# Patient Record
Sex: FEMALE | Race: WHITE | NOT HISPANIC OR LATINO | Employment: OTHER | URBAN - METROPOLITAN AREA
[De-identification: names, ages, dates, MRNs, and addresses within clinical notes are randomized per-mention and may not be internally consistent; named-entity substitution may affect disease eponyms.]

---

## 2017-02-14 ENCOUNTER — HOSPITAL ENCOUNTER (OUTPATIENT)
Dept: OTHER | Facility: HOSPITAL | Age: 67
Discharge: HOME | End: 2017-02-14
Attending: FAMILY MEDICINE | Admitting: FAMILY MEDICINE

## 2017-08-24 ENCOUNTER — HOSPITAL ENCOUNTER (OUTPATIENT)
Dept: OTHER | Facility: HOSPITAL | Age: 67
Discharge: HOME | End: 2017-08-24
Attending: FAMILY MEDICINE | Admitting: FAMILY MEDICINE

## 2017-10-02 ENCOUNTER — HOSPITAL ENCOUNTER (OUTPATIENT)
Dept: OTHER | Facility: HOSPITAL | Age: 67
Discharge: HOME | End: 2017-10-02
Attending: FAMILY MEDICINE | Admitting: FAMILY MEDICINE

## 2018-11-06 ENCOUNTER — HOSPITAL ENCOUNTER (OUTPATIENT)
Dept: OTHER | Facility: HOSPITAL | Age: 68
Discharge: HOME | End: 2018-11-06
Attending: FAMILY MEDICINE | Admitting: FAMILY MEDICINE

## 2018-11-12 ENCOUNTER — CONVERSION ENCOUNTER (OUTPATIENT)
Dept: FAMILY MEDICINE CLINIC | Facility: CLINIC | Age: 68
End: 2018-11-12

## 2018-11-12 LAB
ALBUMIN SERPL-MCNC: 3.9 G/DL (ref 3.6–5.1)
ALP SERPL-CCNC: 50 U/L (ref 33–130)
AST SERPL-CCNC: 21 U/L (ref 10–35)
BILIRUB SERPL-MCNC: 0.5 MG/DL (ref 0.2–1.2)
BUN SERPL-MCNC: 6 MG/DL (ref 7–25)
BUN/CREAT SERPL: 7.5 (CALC) (ref 6–22)
CALCIUM SERPL-MCNC: 9.2 MG/DL (ref 8.6–10.2)
CHLORIDE SERPL-SCNC: 98 MMOL/L (ref 98–110)
CONV CO2: 28 MMOL/L (ref 21–33)
CONV TOTAL PROTEIN: 6.6 G/DL (ref 6.2–8.3)
CREAT UR-MCNC: 0.8 MG/DL (ref 0.6–1.18)
GLOBULIN UR ELPH-MCNC: 2.7 MG/DL (ref 2.2–3.9)
GLUCOSE UR QL: 100 MG/DL (ref 65–99)
INSULIN SERPL-ACNC: 1.4 (CALC) (ref 1–2.1)
POTASSIUM SERPL-SCNC: 3.5 MMOL/L (ref 3.5–5.3)
SODIUM SERPL-SCNC: 135 MMOL/L (ref 135–146)

## 2019-06-26 ENCOUNTER — TELEPHONE (OUTPATIENT)
Dept: FAMILY MEDICINE CLINIC | Facility: CLINIC | Age: 69
End: 2019-06-26

## 2019-06-26 DIAGNOSIS — N32.9 BLADDER DISORDER: Primary | ICD-10-CM

## 2019-06-26 NOTE — TELEPHONE ENCOUNTER
Needs refill on her myrbetricq 50 mg. She stated that Dr. Schaefer had given her samples when she was last seen. Stated that it is helping and she would like a script sent in

## 2019-08-13 DIAGNOSIS — E87.6 HYPOKALEMIA: Primary | ICD-10-CM

## 2019-08-14 LAB
ALBUMIN SERPL-MCNC: 4.5 G/DL (ref 3.6–4.8)
ALBUMIN/GLOB SERPL: 1.7 {RATIO} (ref 1.2–2.2)
ALP SERPL-CCNC: 53 IU/L (ref 39–117)
ALT SERPL-CCNC: 20 IU/L (ref 0–32)
AST SERPL-CCNC: 23 IU/L (ref 0–40)
BILIRUB SERPL-MCNC: 0.5 MG/DL (ref 0–1.2)
BUN SERPL-MCNC: 12 MG/DL (ref 8–27)
BUN/CREAT SERPL: 15 (ref 12–28)
CALCIUM SERPL-MCNC: 9.8 MG/DL (ref 8.7–10.3)
CHLORIDE SERPL-SCNC: 102 MMOL/L (ref 96–106)
CO2 SERPL-SCNC: 25 MMOL/L (ref 20–29)
CREAT SERPL-MCNC: 0.82 MG/DL (ref 0.57–1)
GLOBULIN SER CALC-MCNC: 2.6 G/DL (ref 1.5–4.5)
GLUCOSE SERPL-MCNC: 96 MG/DL (ref 65–99)
POTASSIUM SERPL-SCNC: 3.6 MMOL/L (ref 3.5–5.2)
PROT SERPL-MCNC: 7.1 G/DL (ref 6–8.5)
SODIUM SERPL-SCNC: 143 MMOL/L (ref 134–144)

## 2019-08-18 ENCOUNTER — RESULTS ENCOUNTER (OUTPATIENT)
Dept: FAMILY MEDICINE CLINIC | Facility: CLINIC | Age: 69
End: 2019-08-18

## 2019-08-18 DIAGNOSIS — E87.6 HYPOKALEMIA: ICD-10-CM

## 2019-08-26 DIAGNOSIS — I10 ESSENTIAL HYPERTENSION: Primary | ICD-10-CM

## 2019-08-26 RX ORDER — AMLODIPINE BESYLATE 10 MG/1
10 TABLET ORAL DAILY
Qty: 30 TABLET | Refills: 12 | Status: SHIPPED | OUTPATIENT
Start: 2019-08-26 | End: 2020-11-30 | Stop reason: SDUPTHER

## 2019-08-30 RX ORDER — AMLODIPINE BESYLATE 10 MG/1
10 TABLET ORAL DAILY
Qty: 30 TABLET | Refills: 12 | Status: SHIPPED | OUTPATIENT
Start: 2019-08-30 | End: 2020-09-21

## 2019-10-17 RX ORDER — POTASSIUM CHLORIDE 750 MG/1
10 TABLET, FILM COATED, EXTENDED RELEASE ORAL DAILY
Qty: 30 TABLET | Refills: 12 | Status: SHIPPED | OUTPATIENT
Start: 2019-10-17 | End: 2019-11-28 | Stop reason: SDUPTHER

## 2019-12-02 RX ORDER — POTASSIUM CHLORIDE 750 MG/1
10 TABLET, FILM COATED, EXTENDED RELEASE ORAL DAILY
Qty: 30 TABLET | Refills: 12 | Status: SHIPPED | OUTPATIENT
Start: 2019-12-02 | End: 2020-11-30 | Stop reason: SDUPTHER

## 2020-03-25 ENCOUNTER — TELEPHONE (OUTPATIENT)
Dept: FAMILY MEDICINE CLINIC | Facility: CLINIC | Age: 70
End: 2020-03-25

## 2020-03-25 DIAGNOSIS — I10 ESSENTIAL HYPERTENSION: ICD-10-CM

## 2020-03-25 DIAGNOSIS — I10 ESSENTIAL HYPERTENSION: Primary | ICD-10-CM

## 2020-03-25 RX ORDER — HYDROCHLOROTHIAZIDE 25 MG/1
25 TABLET ORAL DAILY
Qty: 30 TABLET | Refills: 12 | Status: SHIPPED | OUTPATIENT
Start: 2020-03-25 | End: 2020-03-25 | Stop reason: SDUPTHER

## 2020-03-25 RX ORDER — HYDROCHLOROTHIAZIDE 25 MG/1
25 TABLET ORAL DAILY
Qty: 30 TABLET | Refills: 12 | Status: SHIPPED | OUTPATIENT
Start: 2020-03-25 | End: 2020-11-30 | Stop reason: SDUPTHER

## 2020-04-20 ENCOUNTER — TELEPHONE (OUTPATIENT)
Dept: FAMILY MEDICINE CLINIC | Facility: CLINIC | Age: 70
End: 2020-04-20

## 2020-09-21 RX ORDER — AMLODIPINE BESYLATE 10 MG/1
10 TABLET ORAL DAILY
Qty: 30 TABLET | Refills: 0 | Status: SHIPPED | OUTPATIENT
Start: 2020-09-21 | End: 2020-10-16

## 2020-10-16 RX ORDER — AMLODIPINE BESYLATE 10 MG/1
TABLET ORAL
Qty: 30 TABLET | Refills: 0 | Status: SHIPPED | OUTPATIENT
Start: 2020-10-16 | End: 2020-11-18

## 2020-11-18 DIAGNOSIS — I10 ESSENTIAL HYPERTENSION: Primary | ICD-10-CM

## 2020-11-18 RX ORDER — AMLODIPINE BESYLATE 10 MG/1
TABLET ORAL
Qty: 30 TABLET | Refills: 0 | Status: SHIPPED | OUTPATIENT
Start: 2020-11-18 | End: 2020-11-30 | Stop reason: SDUPTHER

## 2020-11-23 PROBLEM — Z12.31 ENCOUNTER FOR SCREENING MAMMOGRAM FOR MALIGNANT NEOPLASM OF BREAST: Status: ACTIVE | Noted: 2020-11-23

## 2020-11-23 PROBLEM — Z00.00 ANNUAL VISIT FOR GENERAL ADULT MEDICAL EXAMINATION WITHOUT ABNORMAL FINDINGS: Status: ACTIVE | Noted: 2020-11-23

## 2020-11-23 PROBLEM — Z78.0 ASYMPTOMATIC MENOPAUSAL STATE: Status: ACTIVE | Noted: 2020-11-23

## 2020-11-23 PROBLEM — Z12.11 COLON CANCER SCREENING: Status: ACTIVE | Noted: 2020-11-23

## 2020-11-23 PROBLEM — Z12.4 CERVICAL CANCER SCREENING: Status: ACTIVE | Noted: 2020-11-23

## 2020-11-30 ENCOUNTER — OFFICE VISIT (OUTPATIENT)
Dept: FAMILY MEDICINE CLINIC | Facility: CLINIC | Age: 70
End: 2020-11-30

## 2020-11-30 VITALS
DIASTOLIC BLOOD PRESSURE: 72 MMHG | RESPIRATION RATE: 16 BRPM | TEMPERATURE: 97.3 F | SYSTOLIC BLOOD PRESSURE: 120 MMHG | OXYGEN SATURATION: 96 % | HEIGHT: 62 IN | WEIGHT: 165.6 LBS | BODY MASS INDEX: 30.47 KG/M2 | HEART RATE: 80 BPM

## 2020-11-30 DIAGNOSIS — Z00.00 MEDICARE ANNUAL WELLNESS VISIT, SUBSEQUENT: Primary | ICD-10-CM

## 2020-11-30 DIAGNOSIS — Z87.891 HISTORY OF TOBACCO USE: ICD-10-CM

## 2020-11-30 DIAGNOSIS — I10 BENIGN HYPERTENSION: ICD-10-CM

## 2020-11-30 DIAGNOSIS — M21.70 LEG LENGTH DISCREPANCY: ICD-10-CM

## 2020-11-30 DIAGNOSIS — Z11.59 NEED FOR HEPATITIS C SCREENING TEST: ICD-10-CM

## 2020-11-30 DIAGNOSIS — Z78.0 ASYMPTOMATIC MENOPAUSAL STATE: ICD-10-CM

## 2020-11-30 DIAGNOSIS — J30.1 SEASONAL ALLERGIC RHINITIS DUE TO POLLEN: ICD-10-CM

## 2020-11-30 DIAGNOSIS — Z13.220 SCREENING FOR HYPERLIPIDEMIA: ICD-10-CM

## 2020-11-30 DIAGNOSIS — M20.32: ICD-10-CM

## 2020-11-30 DIAGNOSIS — Z12.11 COLON CANCER SCREENING: ICD-10-CM

## 2020-11-30 DIAGNOSIS — Z12.4 CERVICAL CANCER SCREENING: ICD-10-CM

## 2020-11-30 DIAGNOSIS — Z12.31 ENCOUNTER FOR SCREENING MAMMOGRAM FOR MALIGNANT NEOPLASM OF BREAST: ICD-10-CM

## 2020-11-30 DIAGNOSIS — N39.46 MIXED STRESS AND URGE URINARY INCONTINENCE: ICD-10-CM

## 2020-11-30 DIAGNOSIS — Q67.5 SCOLIOSIS, CONGENITAL: ICD-10-CM

## 2020-11-30 LAB
BILIRUB BLD-MCNC: NEGATIVE MG/DL
CLARITY, POC: CLEAR
COLOR UR: YELLOW
GLUCOSE UR STRIP-MCNC: NEGATIVE MG/DL
KETONES UR QL: NEGATIVE
LEUKOCYTE EST, POC: NEGATIVE
NITRITE UR-MCNC: NEGATIVE MG/ML
PH UR: 8 [PH] (ref 5–8)
PROT UR STRIP-MCNC: NEGATIVE MG/DL
RBC # UR STRIP: NEGATIVE /UL
SP GR UR: 1 (ref 1–1.03)
UROBILINOGEN UR QL: NORMAL

## 2020-11-30 PROCEDURE — 81002 URINALYSIS NONAUTO W/O SCOPE: CPT | Performed by: FAMILY MEDICINE

## 2020-11-30 PROCEDURE — 99213 OFFICE O/P EST LOW 20 MIN: CPT | Performed by: FAMILY MEDICINE

## 2020-11-30 PROCEDURE — G0402 INITIAL PREVENTIVE EXAM: HCPCS | Performed by: FAMILY MEDICINE

## 2020-11-30 RX ORDER — HYDROCHLOROTHIAZIDE 25 MG/1
25 TABLET ORAL DAILY
Qty: 30 TABLET | Refills: 12 | Status: SHIPPED | OUTPATIENT
Start: 2020-11-30 | End: 2022-01-13

## 2020-11-30 RX ORDER — OXYBUTYNIN CHLORIDE 15 MG/1
15 TABLET, EXTENDED RELEASE ORAL DAILY
Qty: 90 TABLET | Refills: 3 | Status: SHIPPED | OUTPATIENT
Start: 2020-11-30 | End: 2021-02-08

## 2020-11-30 RX ORDER — ASPIRIN 325 MG
325 TABLET ORAL DAILY
COMMUNITY

## 2020-11-30 RX ORDER — POTASSIUM CHLORIDE 750 MG/1
10 TABLET, FILM COATED, EXTENDED RELEASE ORAL DAILY
Qty: 90 TABLET | Refills: 4 | Status: SHIPPED | OUTPATIENT
Start: 2020-11-30 | End: 2022-01-12 | Stop reason: SDUPTHER

## 2020-11-30 RX ORDER — AMLODIPINE BESYLATE 10 MG/1
10 TABLET ORAL DAILY
Qty: 30 TABLET | Refills: 12 | Status: SHIPPED | OUTPATIENT
Start: 2020-11-30 | End: 2021-12-17

## 2020-12-01 LAB
ALBUMIN SERPL-MCNC: 4.2 G/DL (ref 3.8–4.8)
ALBUMIN/GLOB SERPL: 1.6 {RATIO} (ref 1.2–2.2)
ALP SERPL-CCNC: 60 IU/L (ref 39–117)
ALT SERPL-CCNC: 18 IU/L (ref 0–32)
AST SERPL-CCNC: 19 IU/L (ref 0–40)
BASOPHILS # BLD AUTO: 0.1 X10E3/UL (ref 0–0.2)
BASOPHILS NFR BLD AUTO: 1 %
BILIRUB SERPL-MCNC: 0.4 MG/DL (ref 0–1.2)
BUN SERPL-MCNC: 12 MG/DL (ref 8–27)
BUN/CREAT SERPL: 14 (ref 12–28)
CALCIUM SERPL-MCNC: 9.6 MG/DL (ref 8.7–10.3)
CHLORIDE SERPL-SCNC: 102 MMOL/L (ref 96–106)
CHOLEST SERPL-MCNC: 183 MG/DL (ref 100–199)
CHOLEST/HDLC SERPL: 2.4 RATIO (ref 0–4.4)
CO2 SERPL-SCNC: 24 MMOL/L (ref 20–29)
CREAT SERPL-MCNC: 0.85 MG/DL (ref 0.57–1)
EOSINOPHIL # BLD AUTO: 0.1 X10E3/UL (ref 0–0.4)
EOSINOPHIL NFR BLD AUTO: 2 %
ERYTHROCYTE [DISTWIDTH] IN BLOOD BY AUTOMATED COUNT: 12.3 % (ref 11.7–15.4)
GLOBULIN SER CALC-MCNC: 2.6 G/DL (ref 1.5–4.5)
GLUCOSE SERPL-MCNC: 100 MG/DL (ref 65–99)
HCT VFR BLD AUTO: 40.8 % (ref 34–46.6)
HCV AB S/CO SERPL IA: <0.1 S/CO RATIO (ref 0–0.9)
HDLC SERPL-MCNC: 76 MG/DL
HGB BLD-MCNC: 13.9 G/DL (ref 11.1–15.9)
IMM GRANULOCYTES # BLD AUTO: 0 X10E3/UL (ref 0–0.1)
IMM GRANULOCYTES NFR BLD AUTO: 0 %
LDLC SERPL CALC-MCNC: 95 MG/DL (ref 0–99)
LYMPHOCYTES # BLD AUTO: 1.2 X10E3/UL (ref 0.7–3.1)
LYMPHOCYTES NFR BLD AUTO: 18 %
MCH RBC QN AUTO: 30.4 PG (ref 26.6–33)
MCHC RBC AUTO-ENTMCNC: 34.1 G/DL (ref 31.5–35.7)
MCV RBC AUTO: 89 FL (ref 79–97)
MONOCYTES # BLD AUTO: 0.6 X10E3/UL (ref 0.1–0.9)
MONOCYTES NFR BLD AUTO: 10 %
NEUTROPHILS # BLD AUTO: 4.6 X10E3/UL (ref 1.4–7)
NEUTROPHILS NFR BLD AUTO: 69 %
PLATELET # BLD AUTO: 354 X10E3/UL (ref 150–450)
POTASSIUM SERPL-SCNC: 3.5 MMOL/L (ref 3.5–5.2)
PROT SERPL-MCNC: 6.8 G/DL (ref 6–8.5)
RBC # BLD AUTO: 4.57 X10E6/UL (ref 3.77–5.28)
SODIUM SERPL-SCNC: 141 MMOL/L (ref 134–144)
TRIGL SERPL-MCNC: 66 MG/DL (ref 0–149)
TSH SERPL DL<=0.005 MIU/L-ACNC: 1.38 UIU/ML (ref 0.45–4.5)
VLDLC SERPL CALC-MCNC: 12 MG/DL (ref 5–40)
WBC # BLD AUTO: 6.6 X10E3/UL (ref 3.4–10.8)

## 2020-12-16 ENCOUNTER — HOSPITAL ENCOUNTER (OUTPATIENT)
Dept: MAMMOGRAPHY | Facility: HOSPITAL | Age: 70
Discharge: HOME | End: 2020-12-16

## 2020-12-16 ENCOUNTER — TELEPHONE (OUTPATIENT)
Dept: FAMILY MEDICINE CLINIC | Facility: CLINIC | Age: 70
End: 2020-12-16

## 2020-12-16 ENCOUNTER — HOSPITAL ENCOUNTER (OUTPATIENT)
Dept: CT IMAGING | Facility: HOSPITAL | Age: 70
Discharge: HOME | End: 2020-12-16

## 2020-12-16 DIAGNOSIS — Z87.891 HISTORY OF TOBACCO USE: ICD-10-CM

## 2020-12-16 PROCEDURE — 77063 BREAST TOMOSYNTHESIS BI: CPT

## 2020-12-16 PROCEDURE — 77067 SCR MAMMO BI INCL CAD: CPT

## 2020-12-16 PROCEDURE — G0297 LDCT FOR LUNG CA SCREEN: HCPCS

## 2020-12-16 NOTE — TELEPHONE ENCOUNTER
----- Message from Ivette Schaefer MD sent at 12/16/2020  5:05 PM EST -----  Let her know that the ct scan shows a 6mm nodule in her left upper lung. Nodules are common in this area due to the [facility]. Repeat ct scan in 6 months to make sure it stays stable in size.. Scan also shows moderate emphysema so it was good that she stopped smoking.

## 2020-12-16 NOTE — TELEPHONE ENCOUNTER
Called and spoke to [place]. Informed her of CT scan results of 6mm nodule in LT upper lung. Repeat scan in 6 months to make sure stable in size. Moderate emphysema. She expressed understanding.

## 2021-01-11 DIAGNOSIS — Z78.0 ASYMPTOMATIC MENOPAUSAL STATE: Primary | ICD-10-CM

## 2021-02-03 ENCOUNTER — HOSPITAL ENCOUNTER (OUTPATIENT)
Dept: BONE DENSITY | Facility: HOSPITAL | Age: 71
Discharge: HOME | End: 2021-02-03
Admitting: FAMILY MEDICINE

## 2021-02-03 DIAGNOSIS — Z78.0 ASYMPTOMATIC MENOPAUSAL STATE: ICD-10-CM

## 2021-02-03 DIAGNOSIS — M85.852 OSTEOPENIA OF NECKS OF BOTH FEMURS: Primary | ICD-10-CM

## 2021-02-03 DIAGNOSIS — M85.851 OSTEOPENIA OF NECKS OF BOTH FEMURS: Primary | ICD-10-CM

## 2021-02-03 PROBLEM — M85.859 OSTEOPENIA OF NECK OF FEMUR: Status: ACTIVE | Noted: 2021-02-03

## 2021-02-03 PROCEDURE — 77080 DXA BONE DENSITY AXIAL: CPT

## 2021-02-03 RX ORDER — ALENDRONATE SODIUM 70 MG/1
70 TABLET ORAL
Qty: 4 TABLET | Refills: 12 | Status: SHIPPED | OUTPATIENT
Start: 2021-02-03 | End: 2022-01-05

## 2021-02-08 ENCOUNTER — OFFICE VISIT (OUTPATIENT)
Dept: FAMILY MEDICINE CLINIC | Facility: CLINIC | Age: 71
End: 2021-02-08

## 2021-02-08 ENCOUNTER — HOSPITAL ENCOUNTER (OUTPATIENT)
Dept: GENERAL RADIOLOGY | Facility: HOSPITAL | Age: 71
Discharge: HOME | End: 2021-02-08
Admitting: FAMILY MEDICINE

## 2021-02-08 VITALS
WEIGHT: 168.8 LBS | HEIGHT: 62 IN | BODY MASS INDEX: 31.06 KG/M2 | TEMPERATURE: 97.8 F | RESPIRATION RATE: 18 BRPM | DIASTOLIC BLOOD PRESSURE: 62 MMHG | HEART RATE: 73 BPM | OXYGEN SATURATION: 96 % | SYSTOLIC BLOOD PRESSURE: 124 MMHG

## 2021-02-08 DIAGNOSIS — M54.41 CHRONIC RIGHT-SIDED LOW BACK PAIN WITH BILATERAL SCIATICA: Primary | ICD-10-CM

## 2021-02-08 DIAGNOSIS — G89.29 CHRONIC RIGHT-SIDED LOW BACK PAIN WITH BILATERAL SCIATICA: ICD-10-CM

## 2021-02-08 DIAGNOSIS — M21.70 LEG LENGTH DISCREPANCY: ICD-10-CM

## 2021-02-08 DIAGNOSIS — M54.42 CHRONIC RIGHT-SIDED LOW BACK PAIN WITH BILATERAL SCIATICA: Primary | ICD-10-CM

## 2021-02-08 DIAGNOSIS — Z87.891 HISTORY OF TOBACCO USE: ICD-10-CM

## 2021-02-08 DIAGNOSIS — M54.41 CHRONIC RIGHT-SIDED LOW BACK PAIN WITH BILATERAL SCIATICA: ICD-10-CM

## 2021-02-08 DIAGNOSIS — I10 BENIGN HYPERTENSION: ICD-10-CM

## 2021-02-08 DIAGNOSIS — M51.16 LUMBAR DISC DISEASE WITH RADICULOPATHY: ICD-10-CM

## 2021-02-08 DIAGNOSIS — G89.29 CHRONIC RIGHT-SIDED LOW BACK PAIN WITH BILATERAL SCIATICA: Primary | ICD-10-CM

## 2021-02-08 DIAGNOSIS — M54.42 CHRONIC RIGHT-SIDED LOW BACK PAIN WITH BILATERAL SCIATICA: ICD-10-CM

## 2021-02-08 PROBLEM — I25.10 ARTERIOSCLEROTIC CARDIOVASCULAR DISEASE (ASCVD): Status: ACTIVE | Noted: 2021-02-08

## 2021-02-08 PROCEDURE — 99214 OFFICE O/P EST MOD 30 MIN: CPT | Performed by: FAMILY MEDICINE

## 2021-02-08 PROCEDURE — 72110 X-RAY EXAM L-2 SPINE 4/>VWS: CPT

## 2021-02-08 NOTE — PROGRESS NOTES
Chief Complaint  Back Pain and Hypertension    Subjective     CC  Problem List  Visit Diagnosis   Encounters  Notes  Medications  Labs  Result Review Imaging  Media    Jess Mackay presents to [facility] for   [place] wants to discuss if getting the Covid vaccine would be beneficial.    Back Pain  This is a chronic problem. The current episode started more than 1 year ago. The problem occurs constantly. The problem has been gradually worsening (Curvature in spine worsening.) since onset. The pain is present in the sacro-iliac. The quality of the pain is described as aching. The pain radiates to the right thigh, left thigh, right knee, left knee, right foot and left foot. The pain is at a severity of 2/10. The pain is mild. The pain is the same all the time. The symptoms are aggravated by position, bending and standing (walking). Associated symptoms include numbness (intermittently bilaterally from the knees down, appears spontaneously and resolves spontaneously) and tingling. Pertinent negatives include no abdominal pain, bladder incontinence, chest pain, dysuria, fever, headaches, weakness or weight loss. (Numbness and tingling on ocassion) She has tried NSAIDs for the symptoms. The treatment provided mild relief.   Hypertension  This is a chronic problem. The current episode started more than 1 year ago. The problem is uncontrolled. Pertinent negatives include no chest pain, headaches, orthopnea, palpitations, peripheral edema, PND, shortness of breath or sweats. Risk factors for coronary artery disease include post-menopausal state and family history. Current antihypertension treatment includes calcium channel blockers. The current treatment provides moderate improvement.       Review of Systems   Constitutional: Negative for fever, unexpected weight gain and unexpected weight loss.   Eyes: Negative for visual disturbance.   Respiratory: Negative for shortness of  "breath.    Cardiovascular: Negative for chest pain, palpitations, orthopnea and PND.   Gastrointestinal: Negative for abdominal pain, constipation, diarrhea, nausea and vomiting.   Endocrine: Negative for cold intolerance, heat intolerance, polydipsia and polyuria.   Genitourinary: Negative for dysuria and urinary incontinence.   Musculoskeletal: Positive for back pain.   Neurological: Positive for tingling and numbness (intermittently bilaterally from the knees down, appears spontaneously and resolves spontaneously). Negative for weakness.        Objective   Vital Signs:   /62 (BP Location: Right arm, Patient Position: Sitting, Cuff Size: Adult)   Pulse 73   Temp 97.8 °F (36.6 °C) (Temporal)   Resp 18   Ht 157.5 cm (62\")   Wt 76.6 kg (168 lb 12.8 oz)   SpO2 96% Comment: room air  BMI 30.87 kg/m²     Physical Exam  Constitutional:       General: She is not in acute distress.     Appearance: She is well-developed.   Neck:      Musculoskeletal: Neck supple.      Thyroid: No thyromegaly.      Vascular: No JVD.   Cardiovascular:      Rate and Rhythm: Normal rate and regular rhythm.      Pulses:           Dorsalis pedis pulses are 2+ on the right side and 2+ on the left side.      Heart sounds: Normal heart sounds. No murmur. No friction rub. No gallop.       Comments: Negative lillian's  Pulmonary:      Effort: Pulmonary effort is normal. No respiratory distress.      Breath sounds: Normal breath sounds. No wheezing or rales.   Musculoskeletal:      Right lower leg: No edema.      Left lower leg: No edema.   Lymphadenopathy:      Cervical: No cervical adenopathy.   Skin:     General: Skin is warm.      Findings: No erythema or rash.   Neurological:      General: No focal deficit present.      Mental Status: She is alert and oriented to person, place, and time.      Sensory: No sensory deficit.      Motor: No weakness.      Coordination: Coordination abnormal (right lower extremity shorter than left. waddling " gaint).      Gait: Gait abnormal.      Deep Tendon Reflexes: Reflexes normal.        Result Review :Labs  Result Review  Imaging  Med Tab  Media                 Assessment and Plan CC Problem List  Visit Diagnosis  ROS  Review (Popup)  Health Maintenance  Quality  BestPractice  Medications  SmartSets  SnapShot Encounters  Media  Problem List Items Addressed This Visit        High    Benign hypertension    Overview     Controlled         Current Assessment & Plan     Hypertension is improving with treatment.  Continue current treatment regimen.  Dietary sodium restriction.  Weight loss.  Regular aerobic exercise.  Blood pressure will be reassessed in 3 months.            Medium    Leg length discrepancy    Overview     LLE shorter than right by 6 inches.          Lumbar disc disease with radiculopathy    Overview     Severe with moderate lower lumbar curve with moderate degenerative changes. Pt to capitalize on muscular compensation. MRI and neurosurgical referral if sxs worsen.             Low    History of tobacco use    Overview     1 ppd x 35 yrs.   Low dose CT ordered. 11/30/2020           Other Visit Diagnoses     Chronic right-sided low back pain with bilateral sciatica    -  Primary    Relevant Orders    XR Spine Lumbar Complete 4+VW (Completed)    Ambulatory Referral to Physical Therapy          Follow Up Instructions Charge Capture  Follow-up Communications  Return in about 3 months (around 5/8/2021), or if symptoms worsen or fail to improve.  Patient was given instructions and counseling regarding her condition or for health maintenance advice. Please see specific information pulled into the AVS if appropriate.

## 2021-03-03 ENCOUNTER — TREATMENT (OUTPATIENT)
Dept: PHYSICAL THERAPY | Facility: CLINIC | Age: 71
End: 2021-03-03

## 2021-03-03 DIAGNOSIS — M54.41 CHRONIC BILATERAL LOW BACK PAIN WITH RIGHT-SIDED SCIATICA: Primary | ICD-10-CM

## 2021-03-03 DIAGNOSIS — G89.29 CHRONIC BILATERAL LOW BACK PAIN WITH RIGHT-SIDED SCIATICA: Primary | ICD-10-CM

## 2021-03-03 PROCEDURE — 97140 MANUAL THERAPY 1/> REGIONS: CPT | Performed by: PHYSICAL THERAPIST

## 2021-03-03 PROCEDURE — 97162 PT EVAL MOD COMPLEX 30 MIN: CPT | Performed by: PHYSICAL THERAPIST

## 2021-03-03 NOTE — PROGRESS NOTES
Physical Therapy Initial Evaluation and Plan of Care    Patient: Jess Mackay   : 1950  Diagnosis/ICD-10 Code:  Chronic bilateral low back pain with right-sided sciatica [M54.41, G89.29]  Referring practitioner: Ivette Schaefer MD  Date of Initial Visit: 3/3/2021  Today's Date: 3/3/2021  Patient seen for 1 sessions           Subjective Questionnaire: Oswestry: 32/50 --> 64% disability      Subjective Evaluation    History of Present Illness  Mechanism of injury: Pt is a 70 yo female with c/o increased back pain.  Pain worsened recently.  Retired 2019.  Thanksgiving night - her  fell down steps and broke his femur.  He was in wheelchair for several months - she wasn't able to lift his chair in/out of car - caused her to fall at one point.  Now planning to sell house so she will be able to travel with her .  Difficulty walking more than a few mins.  Tends to lean forward on counter or cart.  Long history of back problems due to scoliosis.  First dx in her mid 30s.  Has large heel lift on her L shoe.  Does deal with radiating pain/achy and then numbness into B lower legs.  Wears her shoes almost all of the time.  Has worst pain when she is getting ready in the am - not wearing her shoes.  Has trouble standing upright.  Prefers standing to sitting but needs to be able to lean on something.  Returned to work part time at the [employer].  Has a hard shell back brace - she wears it mostly when she is working/standing - uses it daily.  Not wearing currently.  Difficulty tolerating cleaning.  Sleeping - has to sleep on her L side.  Cannot tolerate supine.  Prefers sleeping on the couch with back against back of couch.  Does go to chiro 1 x every 3 weeks.  Feels better when she is there but pain returns when she sits to drive home.  Feels unsteady without her brace.  Does have walking sticks she uses at times.      Quality of life: good    Pain  Current pain ratin  At worst pain rating:  8  Quality: dull ache and tight  Relieving factors: change in position, heat and ice  Aggravating factors: ambulation and sleeping  Progression: worsening    Social Support  Lives with: spouse    Diagnostic Tests  X-ray: abnormal    Treatments  Previous treatment: physical therapy and chiropractic  Current treatment: physical therapy  Patient Goals  Patient goals for therapy: increased motion, improved balance, increased strength, independence with ADLs/IADLs and return to sport/leisure activities             Objective          Active Range of Motion     Additional Active Range of Motion Details  Trunk AROM in standing:  Flex 25% with inc LBP  Ext 25% inc LBP  RSB very minimal motion  LSB 50%  L Rot 50%  R Rot 25%    Strength/Myotome Testing     Left Hip   Planes of Motion   Flexion: 4+  Abduction: 4+  Adduction: 4+    Right Hip   Planes of Motion   Flexion: 4+  Abduction: 4+  Adduction: 4+    Left Knee   Flexion: 4+  Extension: 4+    Right Knee   Flexion: 4+  Extension: 4+    Left Ankle/Foot   Dorsiflexion: 4+    Right Ankle/Foot   Dorsiflexion: 4+     General Comments     Lumbar Comments  Standing posture - pelvis shifted to R, Shoulders shifted to L  Heel lift built into L shoe  Pain/tingling into B lower legs when she is walking more than a couple minutes  Amb with no AD most of the time - uses cart at grocery to help, using walking sticks on occasion when out in yard           Assessment & Plan     Assessment  Impairments: abnormal gait, abnormal or restricted ROM, activity intolerance, impaired balance, lacks appropriate home exercise program and pain with function  Assessment details: Pt presents with increased back pain with radiating lower leg pain.  Posture affected significantly by scoliosis.  Difficulty walking more than a few minutes.  Unable to tolerate standing without leaning forward onto something.  Difficulty carrying items when walking.  Decreased balance.    Prognosis: good  Functional  Limitations: walking, sitting and standing  Goals  Plan Goals: STG  Pt I with HEP in 2 weeks  To dec pain to 3-4/10 in 2-3 weeks.  To improve juan 30 min exercise without inc pain in 2-3 weeks.    LTG  To dec pain 1-2/10 max in 8-10 weeks.  To tolerate sleep through the night with no inc pain in 8-10 weeks.  To tolerate walking 5 min without inc pain/loss of balance in 8-10 weeks.      Plan  Therapy options: will be seen for skilled physical therapy services  Planned modality interventions: cryotherapy, electrical stimulation/Russian stimulation, traction, thermotherapy (hydrocollator packs), dry needling and ultrasound  Planned therapy interventions: abdominal trunk stabilization, body mechanics training, flexibility, functional ROM exercises, home exercise program, manual therapy, strengthening, stretching, therapeutic activities, ADL retraining, balance/weight-bearing training, gait training, neuromuscular re-education and postural training  Frequency: 2x week  Duration in visits: 20  Treatment plan discussed with: patient  Plan details: Began with gentle stretching and soft tissue work.  Plan to add gentle exercise to improve core strength and general endurance.  Will focus on HEP to allow pt to be able to travel without worsening symptoms.  Plan to add balance training as tolerated.  Modalities as needed for pain.          Timed:         Manual Therapy:    10     mins  59810;     Therapeutic Exercise:         mins  47430;     Neuromuscular Michael:        mins  51416;    Therapeutic Activity:          mins  44154;     Gait Training:           mins  95470;     Ultrasound:          mins  04368;    Ionto                                   mins   10852    Un-Timed:  Electrical Stimulation:         mins  24935 ( );  Dry Needling          mins self-pay  Traction          mins 00288  Low Eval          Mins  94691  Mod Eval     40     Mins  34615  High Eval                            Mins  36080        Timed  Treatment:   20   mins   Total Treatment:     50   mins    PT SIGNATURE: Palmira Leigh, PT   DATE TREATMENT INITIATED: 3/3/2021    Medicare Initial Certification  Certification Period: 6/1/2021  I certify that the therapy services are furnished while this patient is under my care.  The services outlined above are required by this patient, and will be reviewed every 90 days.     PHYSICIAN: Ivette Schaefer MD      DATE:     Please sign and return via fax to [phone].. Thank you, [facility].

## 2021-03-10 ENCOUNTER — TREATMENT (OUTPATIENT)
Dept: PHYSICAL THERAPY | Facility: CLINIC | Age: 71
End: 2021-03-10

## 2021-03-10 DIAGNOSIS — G89.29 CHRONIC BILATERAL LOW BACK PAIN WITH RIGHT-SIDED SCIATICA: Primary | ICD-10-CM

## 2021-03-10 DIAGNOSIS — M54.41 CHRONIC BILATERAL LOW BACK PAIN WITH RIGHT-SIDED SCIATICA: Primary | ICD-10-CM

## 2021-03-10 PROCEDURE — 97110 THERAPEUTIC EXERCISES: CPT | Performed by: PHYSICAL THERAPIST

## 2021-03-10 PROCEDURE — 97140 MANUAL THERAPY 1/> REGIONS: CPT | Performed by: PHYSICAL THERAPIST

## 2021-03-10 NOTE — PROGRESS NOTES
Physical Therapy Daily Progress Note      Patient: Jess Mackay   : 1950  Diagnosis/ICD-10 Code:  Chronic bilateral low back pain with right-sided sciatica [M54.41, G89.29]  Referring practitioner: Ivette Schaefer MD  Date of Initial Visit: Type: THERAPY  Noted: 3/3/2021  Today's Date: 3/10/2021  Patient seen for 2 sessions         Jess Mackay reports: she is still having increased soreness at times but what has bothered her most is how stiff she gets and moving becomes difficult. Pt. Reports she felt good after stretches last visit and is hopeful to be able to do some at home to help on days she is stiff. Pt. States her most comfortable position is standing upright with support such as a counter top.    Objective   See Exercise, Manual, and Modality Logs for complete treatment.     Access Code: [email or web address]   URL: [email or web address]   Date: 03/10/2021   Prepared by: Alexandria Villegas     Exercises  Seated Hamstring Stretch - 3 reps - 1 sets - 15 sec hold - 1x daily - 7x weekly  Seated Piriformis Stretch - 3 reps - 1 sets - 15 sec hold - 1x daily - 7x weekly  Gastroc Stretch with Foot at Wall - 3 reps - 1 sets - 15 sec hold - 1x daily - 7x weekly  Standing Gastroc Stretch - 3 reps - 1 sets - 15 sec hold - 1x daily - 7x weekly  Standing Hip Abduction with Anterior Support - 10 reps - 1 sets - 1x daily - 7x weekly  Standing Hip Extension with Chair - 10 reps - 1 sets - 1x daily - 7x weekly    Assessment/Plan  Pt. tolerates treatment well this visit and completes new stretches and exercises with great response to relief of stiffness and discomfort. Pt. Continues to ambulate with deficient gait pattern due to leg length discrepancy and prescence of scoliosis. Pt. Performed baalcne activity well this date and will benefit from balance training and equal LE weight distribution training.    Progress per Plan of Care           Timed:         Manual Therapy:    15     mins  13774;     Therapeutic  Exercise:    10     mins  90141;     Neuromuscular Michael:    5    mins  07342;        Timed Treatment:   30   mins   Total Treatment:     45   mins    Alexandria Villegas PTA  Physical Therapist Assistant License #[id number]

## 2021-03-15 ENCOUNTER — TREATMENT (OUTPATIENT)
Dept: PHYSICAL THERAPY | Facility: CLINIC | Age: 71
End: 2021-03-15

## 2021-03-15 DIAGNOSIS — G89.29 CHRONIC BILATERAL LOW BACK PAIN WITH RIGHT-SIDED SCIATICA: Primary | ICD-10-CM

## 2021-03-15 DIAGNOSIS — M54.41 CHRONIC BILATERAL LOW BACK PAIN WITH RIGHT-SIDED SCIATICA: Primary | ICD-10-CM

## 2021-03-15 PROCEDURE — 97110 THERAPEUTIC EXERCISES: CPT | Performed by: PHYSICAL THERAPIST

## 2021-03-15 PROCEDURE — 97140 MANUAL THERAPY 1/> REGIONS: CPT | Performed by: PHYSICAL THERAPIST

## 2021-03-15 NOTE — PROGRESS NOTES
Physical Therapy Daily Progress Note      Patient: Jess Mackay   : 1950  Diagnosis/ICD-10 Code:  Chronic bilateral low back pain with right-sided sciatica [M54.41, G89.29]  Referring practitioner: Ivette Schaefer MD  Date of Initial Visit: Type: THERAPY  Noted: 3/3/2021  Today's Date: 3/15/2021  Patient seen for 3 sessions         Jess Mackay reports: she is doing better with her stiffness but states she is sore today because she has been cleaning her house frequently due to getting close to putting it on the market for sale.    Objective   See Exercise, Manual, and Modality Logs for complete treatment.     Assessment/Plan  Pt. Tolerates treatment well this visit and continues to show good improvement with activity. Pt. Completes exercise and stretches without reports of pain today only fatigue at this time. Pt. Is encouraged to continue HEP for improved LE strength.    Progress per Plan of Care           Timed:         Manual Therapy:    15     mins  99185;     Therapeutic Exercise:    10     mins  37562;     Neuromuscular Michael:    5    mins  26664;        Timed Treatment:   30   mins   Total Treatment:     45   mins    Alexandria Villegas PTA  Physical Therapist Assistant License #[id number]

## 2021-03-17 ENCOUNTER — TREATMENT (OUTPATIENT)
Dept: PHYSICAL THERAPY | Facility: CLINIC | Age: 71
End: 2021-03-17

## 2021-03-17 DIAGNOSIS — G89.29 CHRONIC BILATERAL LOW BACK PAIN WITH RIGHT-SIDED SCIATICA: Primary | ICD-10-CM

## 2021-03-17 DIAGNOSIS — M54.41 CHRONIC BILATERAL LOW BACK PAIN WITH RIGHT-SIDED SCIATICA: Primary | ICD-10-CM

## 2021-03-17 PROCEDURE — 97110 THERAPEUTIC EXERCISES: CPT | Performed by: PHYSICAL THERAPIST

## 2021-03-17 PROCEDURE — 97140 MANUAL THERAPY 1/> REGIONS: CPT | Performed by: PHYSICAL THERAPIST

## 2021-03-17 NOTE — PROGRESS NOTES
Physical Therapy Daily Progress Note      Patient: Jess Mackay   : 1950  Diagnosis/ICD-10 Code:  Chronic bilateral low back pain with right-sided sciatica [M54.41, G89.29]  Referring practitioner: Ivette Schaefer MD  Date of Initial Visit: Type: THERAPY  Noted: 3/3/2021  Today's Date: 3/17/2021  Patient seen for 4 sessions         Jess Mackay reports: she is doing better today since she did not have any excessive cleaning or manul labor to do since last visit. Pt. States she has been doing her stretches at home and they feel good but she can tell that she needs them.    Objective   See Exercise, Manual, and Modality Logs for complete treatment.     Assessment/Plan   Pt. performing exercises more efficiently today with less pain and even tolerates progressed balance activity on foam with HT stance. Pt. benefits from seated stretching for improving muscle length and function at this time.    Progress per Plan of Care           Timed:         Manual Therapy:    15     mins  65692;     Therapeutic Exercise:    10     mins  17626;     Neuromuscular Michael:   7     mins  74298;      Timed Treatment:   32   mins   Total Treatment:     47   mins    Alexandria Villegas PTA  Physical Therapist Assistant License #[id number]

## 2021-03-22 ENCOUNTER — TREATMENT (OUTPATIENT)
Dept: PHYSICAL THERAPY | Facility: CLINIC | Age: 71
End: 2021-03-22

## 2021-03-22 DIAGNOSIS — M54.41 CHRONIC BILATERAL LOW BACK PAIN WITH RIGHT-SIDED SCIATICA: Primary | ICD-10-CM

## 2021-03-22 DIAGNOSIS — G89.29 CHRONIC BILATERAL LOW BACK PAIN WITH RIGHT-SIDED SCIATICA: Primary | ICD-10-CM

## 2021-03-22 PROCEDURE — 97140 MANUAL THERAPY 1/> REGIONS: CPT | Performed by: PHYSICAL THERAPIST

## 2021-03-22 PROCEDURE — 97112 NEUROMUSCULAR REEDUCATION: CPT | Performed by: PHYSICAL THERAPIST

## 2021-03-22 PROCEDURE — 97110 THERAPEUTIC EXERCISES: CPT | Performed by: PHYSICAL THERAPIST

## 2021-03-24 ENCOUNTER — TREATMENT (OUTPATIENT)
Dept: PHYSICAL THERAPY | Facility: CLINIC | Age: 71
End: 2021-03-24

## 2021-03-24 DIAGNOSIS — G89.29 CHRONIC BILATERAL LOW BACK PAIN WITH RIGHT-SIDED SCIATICA: Primary | ICD-10-CM

## 2021-03-24 DIAGNOSIS — M54.41 CHRONIC BILATERAL LOW BACK PAIN WITH RIGHT-SIDED SCIATICA: Primary | ICD-10-CM

## 2021-03-24 PROCEDURE — 97110 THERAPEUTIC EXERCISES: CPT | Performed by: PHYSICAL THERAPIST

## 2021-03-24 PROCEDURE — 97140 MANUAL THERAPY 1/> REGIONS: CPT | Performed by: PHYSICAL THERAPIST

## 2021-03-24 PROCEDURE — 97112 NEUROMUSCULAR REEDUCATION: CPT | Performed by: PHYSICAL THERAPIST

## 2021-03-24 NOTE — PROGRESS NOTES
Physical Therapy Daily Progress Note      Patient: Jess Mackay   : 1950  Diagnosis/ICD-10 Code:  Chronic bilateral low back pain with right-sided sciatica [M54.41, G89.29]   Problems Addressed this Visit     None      Visit Diagnoses     Chronic bilateral low back pain with right-sided sciatica    -  Primary      Diagnoses       Codes Comments    Chronic bilateral low back pain with right-sided sciatica    -  Primary ICD-10-CM: M54.41, G89.29  ICD-9-CM: 724.2, 724.3, 338.29          Referring practitioner: Ivette Schaefer MD  Date of Initial Visit: Type: THERAPY  Noted: 3/3/2021  Today's Date: 3/24/2021    VISIT#: 6    Subjective Pt stated that she is doing alright.  Some inc soreness after walking her dog without using her walking sticks.      Objective Began with gentle exercise.  Focused on improving balance.  Discussed use of cane when walking to help reduce her side shifting.      See Exercise, Manual, and Modality Logs for complete treatment.     Assessment/Plan Dec pain and stiffness after.      Progress strengthening /stabilization /functional activity         Timed:         Manual Therapy:    15     mins  65543;     Therapeutic Exercise:    15     mins  07763;     Neuromuscular Michael:    8    mins  72977;    Therapeutic Activity:          mins  20494;     Gait Training:           mins  15051;     Ultrasound:          mins  26643;    Ionto                                   mins   34868  Self Care                            mins   98184  Canalith Repos                   mins  99129    Un-Timed:  Electrical Stimulation:         mins  53426 ( );  Dry Needling          mins self-pay  Traction          mins 65458  Low Eval          Mins  45624  Mod Eval          Mins  19407  High Eval                            Mins  37674  Re-Eval                               mins  57205    Timed Treatment:   38   mins   Total Treatment:     48   mins    Palmira Leigh PT  Physical Therapist

## 2021-03-29 ENCOUNTER — TREATMENT (OUTPATIENT)
Dept: PHYSICAL THERAPY | Facility: CLINIC | Age: 71
End: 2021-03-29

## 2021-03-29 DIAGNOSIS — M54.41 CHRONIC BILATERAL LOW BACK PAIN WITH RIGHT-SIDED SCIATICA: Primary | ICD-10-CM

## 2021-03-29 DIAGNOSIS — G89.29 CHRONIC BILATERAL LOW BACK PAIN WITH RIGHT-SIDED SCIATICA: Primary | ICD-10-CM

## 2021-03-29 PROCEDURE — 97140 MANUAL THERAPY 1/> REGIONS: CPT | Performed by: PHYSICAL THERAPIST

## 2021-03-29 PROCEDURE — 97530 THERAPEUTIC ACTIVITIES: CPT | Performed by: PHYSICAL THERAPIST

## 2021-03-29 PROCEDURE — 97110 THERAPEUTIC EXERCISES: CPT | Performed by: PHYSICAL THERAPIST

## 2021-03-29 NOTE — PROGRESS NOTES
Physical Therapy Daily Progress Note    VISIT#: 7    Subjective   Jess Mackay reports that she is doing better and I able to walk farther.   Pain Ratin    Objective     See Exercise, Manual, and Modality Logs for complete treatment.     Patient Education: exercise progression, HEP    Assessment/Plan   Pt tolerated treatment well and exhibited increase muscle tone in R lumbar ES that responded well to STM. Pt was able to progress exercises on uneven surfaces today. Pt still has mild difficulty with walking and feels that she is going to fall forward or to her left, but is able to walk farther distances than she previously could.     Progress per Plan of Care and Progress strengthening /stabilization /functional activity          Timed:         Manual Therapy:    15     mins  17763;     Therapeutic Exercise:    16     mins  28991;     Neuromuscular Michael:        mins  27928;    Therapeutic Activity:     12     mins  52423;     Gait Training:           mins  16738;     Ultrasound:          mins  99396;    Ionto                                   mins   41643  Self Care                            mins   15550  Canalith Repos                   mins  50714    Un-Timed:  Electrical Stimulation:         mins  05089 ( );  Dry Needling          mins self-pay  Traction          mins 27288  Low Eval          Mins  22910  Mod Eval          Mins  04824  High Eval                            Mins  80406  Re-Eval                               mins  71808    Timed Treatment:   43   mins   Total Treatment:     43   mins    Susan Hitchcock  Physical Therapist Assistant  [place] License # [id number]

## 2021-03-31 ENCOUNTER — TREATMENT (OUTPATIENT)
Dept: PHYSICAL THERAPY | Facility: CLINIC | Age: 71
End: 2021-03-31

## 2021-03-31 DIAGNOSIS — M54.41 CHRONIC BILATERAL LOW BACK PAIN WITH RIGHT-SIDED SCIATICA: Primary | ICD-10-CM

## 2021-03-31 DIAGNOSIS — G89.29 CHRONIC BILATERAL LOW BACK PAIN WITH RIGHT-SIDED SCIATICA: Primary | ICD-10-CM

## 2021-03-31 PROCEDURE — 97112 NEUROMUSCULAR REEDUCATION: CPT | Performed by: PHYSICAL THERAPIST

## 2021-03-31 PROCEDURE — 97110 THERAPEUTIC EXERCISES: CPT | Performed by: PHYSICAL THERAPIST

## 2021-03-31 PROCEDURE — 97140 MANUAL THERAPY 1/> REGIONS: CPT | Performed by: PHYSICAL THERAPIST

## 2021-03-31 NOTE — PROGRESS NOTES
Physical Therapy Daily Progress Note      Patient: Jess Mackay   : 1950  Diagnosis/ICD-10 Code:  Chronic bilateral low back pain with right-sided sciatica [M54.41, G89.29]  Referring practitioner: Ivette Schaefer MD  Date of Initial Visit: Type: THERAPY  Noted: 3/3/2021  Today's Date: 3/31/2021  Patient seen for 8 sessions         Jess Mackay reports: she continues to feel much better about walking stating she can go further and feels steadier on her feet. Pt. Reports she remains consistent with her stretches and exercises and sees great benefit to therapy.    Objective   See Exercise, Manual, and Modality Logs for complete treatment.     Assessment/Plan   Pt. Completes exercise and activity today with no pain or discomfort reporting minimal fatigue throughout and shows good tolerance to progressed difficulty with balance on foam this date.    Progress per Plan of Care           Timed:         Manual Therapy:    10     mins  26685;     Therapeutic Exercise:    20     mins  61089;     Neuromuscular Michael:    10    mins  64520;      Timed Treatment:   40   mins   Total Treatment:     55   mins    Alexandria Villegas PTA  Physical Therapist Assistant License #[id number]

## 2021-04-05 ENCOUNTER — TREATMENT (OUTPATIENT)
Dept: PHYSICAL THERAPY | Facility: CLINIC | Age: 71
End: 2021-04-05

## 2021-04-05 DIAGNOSIS — G89.29 CHRONIC BILATERAL LOW BACK PAIN WITH RIGHT-SIDED SCIATICA: Primary | ICD-10-CM

## 2021-04-05 DIAGNOSIS — M54.41 CHRONIC BILATERAL LOW BACK PAIN WITH RIGHT-SIDED SCIATICA: Primary | ICD-10-CM

## 2021-04-05 PROCEDURE — 97110 THERAPEUTIC EXERCISES: CPT | Performed by: PHYSICAL THERAPIST

## 2021-04-05 PROCEDURE — 97530 THERAPEUTIC ACTIVITIES: CPT | Performed by: PHYSICAL THERAPIST

## 2021-04-05 PROCEDURE — 97140 MANUAL THERAPY 1/> REGIONS: CPT | Performed by: PHYSICAL THERAPIST

## 2021-04-05 NOTE — PROGRESS NOTES
Physical Therapy Daily Progress Note    VISIT#: 9    Subjective   Jess Mackay reports that she is doing well. She is able to walk farther distances than before.  Pain Ratin    Objective     See Exercise, Manual, and Modality Logs for complete treatment.     Patient Education: objective improvements, HEP    Assessment/Plan   Pt exhibits decreased pain and increased activity tolerance. Pt is able to take her dogs on multiple walks a day, increased since beginning therapy. Progressed exercises today and plan to progress next visit as tolerated.     Progress per Plan of Care and Progress strengthening /stabilization /functional activity          Timed:         Manual Therapy:    15     mins  88134;     Therapeutic Exercise:    12     mins  09429;     Neuromuscular Michael:        mins  61038;    Therapeutic Activity:     14     mins  74032;     Gait Training:           mins  59322;     Ultrasound:          mins  80315;    Ionto                                   mins   86426  Self Care                            mins   89321  Canalith Repos                   mins  55970    Un-Timed:  Electrical Stimulation:         mins  13222 ( );  Dry Needling          mins self-pay  Traction          mins 42045  Low Eval          Mins  16487  Mod Eval          Mins  46949  High Eval                            Mins  35656  Re-Eval                               mins  91721    Timed Treatment:   41   mins   Total Treatment:     41   mins    Susan Hitchcock  Physical Therapist Assistant  [place] License # [id number]

## 2021-04-07 ENCOUNTER — TREATMENT (OUTPATIENT)
Dept: PHYSICAL THERAPY | Facility: CLINIC | Age: 71
End: 2021-04-07

## 2021-04-07 ENCOUNTER — DOCUMENTATION (OUTPATIENT)
Dept: PHYSICAL THERAPY | Facility: CLINIC | Age: 71
End: 2021-04-07

## 2021-04-07 DIAGNOSIS — G89.29 CHRONIC BILATERAL LOW BACK PAIN WITH RIGHT-SIDED SCIATICA: Primary | ICD-10-CM

## 2021-04-07 DIAGNOSIS — M54.41 CHRONIC BILATERAL LOW BACK PAIN WITH RIGHT-SIDED SCIATICA: Primary | ICD-10-CM

## 2021-04-07 PROCEDURE — 97140 MANUAL THERAPY 1/> REGIONS: CPT | Performed by: PHYSICAL THERAPIST

## 2021-04-07 PROCEDURE — 97530 THERAPEUTIC ACTIVITIES: CPT | Performed by: PHYSICAL THERAPIST

## 2021-04-07 PROCEDURE — 97110 THERAPEUTIC EXERCISES: CPT | Performed by: PHYSICAL THERAPIST

## 2021-04-07 NOTE — PROGRESS NOTES
"Physical Therapy Daily Progress Note    VISIT#: 10    Subjective   Jess Mackay reports that she is able to walk farther distances but still gets fatigued quicker than she would like. She feels that she is going to fall to her right side.   Pain Ratin- \"feels achy\"    Objective     See Exercise, Manual, and Modality Logs for complete treatment.     Patient Education: walking progression at home    Assessment/Plan   Pt continues to improve postural and walking endurance. PTA and pt discussed the timeframe of building muscle and endurance. Pt was able to progress standing exercises today without increased pain and focus on upright posture throughout activities.     Progress per Plan of Care and Progress strengthening /stabilization /functional activity          Timed:         Manual Therapy:    15     mins  21297;     Therapeutic Exercise:    12     mins  20888;     Neuromuscular Michael:        mins  68477;    Therapeutic Activity:     16     mins  04971;     Gait Training:           mins  02749;     Ultrasound:          mins  75159;    Ionto                                   mins   19831  Self Care                            mins   31079  Canalith Repos                   mins  94392    Un-Timed:  Electrical Stimulation:         mins  41581 ( );  Dry Needling          mins self-pay  Traction          mins 22760  Low Eval          Mins  80200  Mod Eval          Mins  79188  High Eval                            Mins  71113  Re-Eval                               mins  54466    Timed Treatment:   43   mins   Total Treatment:     53   mins    Susan Hitchcock  Physical Therapist Assistant  [place] License # [id number]  "

## 2021-04-07 NOTE — PROGRESS NOTES
Physical Therapy Daily Progress Note/ 10th Visit Note      Patient: Jess Mackay   : 1950  Diagnosis/ICD-10 Code:  Chronic bilateral low back pain with right-sided sciatica [M54.41, G89.29]   Problems Addressed this Visit     None      Visit Diagnoses     Chronic bilateral low back pain with right-sided sciatica    -  Primary      Diagnoses       Codes Comments    Chronic bilateral low back pain with right-sided sciatica    -  Primary ICD-10-CM: M54.41, G89.29  ICD-9-CM: 724.2, 724.3, 338.29          Referring practitioner: Ivette Schaefer MD  Date of Initial Visit: Type: THERAPY  Noted: 3/3/2021  Today's Date: 2021    VISIT#: 10    Subjective Questionnaire: Oswestry:     Subjective Doing alright pain rated about 2/10.  Uses cane when outside walking her dog.  Improving tolerance to exercise.      Objective See PTA note for tx this visit.    ROM trunk in standing  Flex 25%   Ext 25%   RSB very minimal motion  LSB 50%  L Rot 50%  R Rot 50%    STRENGTH: BLE 4+/5 grossly    See Exercise, Manual, and Modality Logs for complete treatment.     Assessment/Plan Plan to continue x 10 visits as needed to improve tolerance to ADLs.      Goals  Plan Goals: STG  Pt I with HEP in 2 weeks. MET  To dec pain to 3-4/10 in 2-3 weeks. MET  To improve juan 30 min exercise without inc pain in 2-3 weeks.  MEt  LTG  To dec pain 1-2/10 max in 8-10 weeks. NOT MET  To tolerate sleep through the night with no inc pain in 8-10 weeks. NOT MET  To tolerate walking 5 min without inc pain/loss of balance in 8-10 weeks.  NOT MET    Progress per Plan of Care         Timed:         Manual Therapy:    8     mins  68837;     Therapeutic Exercise:         mins  78373;     Neuromuscular Michael:        mins  21022;    Therapeutic Activity:          mins  34810;     Gait Training:           mins  63447;     Ultrasound:          mins  59056;    Ionto                                   mins   70415  Self Care                             mins   41162  Canalith Repos                   mins  11088    Un-Timed:  Electrical Stimulation:         mins  51617 ( );  Dry Needling          mins self-pay  Traction          mins 80492  Low Eval          Mins  70313  Mod Eval          Mins  75124  High Eval                            Mins  36936  Re-Eval                               mins  18290    Timed Treatment:   8   mins   Total Treatment:        mins    Avis Leigh  Physical Therapist

## 2021-04-12 ENCOUNTER — TREATMENT (OUTPATIENT)
Dept: PHYSICAL THERAPY | Facility: CLINIC | Age: 71
End: 2021-04-12

## 2021-04-12 DIAGNOSIS — G89.29 CHRONIC BILATERAL LOW BACK PAIN WITH RIGHT-SIDED SCIATICA: Primary | ICD-10-CM

## 2021-04-12 DIAGNOSIS — M54.41 CHRONIC BILATERAL LOW BACK PAIN WITH RIGHT-SIDED SCIATICA: Primary | ICD-10-CM

## 2021-04-12 PROCEDURE — 97530 THERAPEUTIC ACTIVITIES: CPT | Performed by: PHYSICAL THERAPIST

## 2021-04-12 PROCEDURE — 97110 THERAPEUTIC EXERCISES: CPT | Performed by: PHYSICAL THERAPIST

## 2021-04-12 PROCEDURE — 97140 MANUAL THERAPY 1/> REGIONS: CPT | Performed by: PHYSICAL THERAPIST

## 2021-04-12 NOTE — PROGRESS NOTES
Physical Therapy Daily Progress Note    VISIT#: 11    Subjective   Jess Mackay reports that she is doing well and continues to be able to tolerate  longer walking distances. She is still using one walking stick for long distances.  Pain Ratin    Objective     See Exercise, Manual, and Modality Logs for complete treatment.     Patient Education: exercise progression    Assessment/Plan   Pt exhibits improved activity tolerance during session and at home. Pt is able to walk farther distances with less pain but still feels like she is pulled to her R side as she fatigues. Pt was able to progress resisted hip exercises today without complaint of increased pain. Plan to progress as tolerated.     Progress per Plan of Care and Progress strengthening /stabilization /functional activity          Timed:         Manual Therapy:    15     mins  53421;     Therapeutic Exercise:    15     mins  29031;     Neuromuscular Michael:        mins  29400;    Therapeutic Activity:     25     mins  28702;     Gait Training:           mins  97799;     Ultrasound:          mins  99420;    Ionto                                   mins   46333  Self Care                            mins   22838  Canalith Repos                   mins  92837    Un-Timed:  Electrical Stimulation:         mins  96750 ( );  Dry Needling          mins self-pay  Traction          mins 38751  Low Eval          Mins  76574  Mod Eval          Mins  49369  High Eval                            Mins  48416  Re-Eval                               mins  74462    Timed Treatment:   55   mins   Total Treatment:     65   mins    Susan Hitchcock  Physical Therapist Assistant  [place] License # [id number]

## 2021-04-13 ENCOUNTER — TELEPHONE (OUTPATIENT)
Dept: FAMILY MEDICINE CLINIC | Facility: CLINIC | Age: 71
End: 2021-04-13

## 2021-04-13 NOTE — TELEPHONE ENCOUNTER
PATIENT STATES DR ALLEN HAD CALLED IN [facility] AND IT WAS TOO EXPENSIVE. SHE STATES ANOTHER MEDICINE THAT WAS CALLED IN BUT IT IS OVER $200. SHE STATES SHE WAS NEVER ABLE TO PICK THESE UP AS THEY WERE TOO PRICEY. PATIENT UNSURE OF THE NAME OF THE OTHER MEDICATION CALLED IN, BUT WOULD LIKE IT RE-SENT AS SHE IS HAVING SOME BLADDER ISSUES AGAIN. VERIFIED [facility]  [address].    PLEASE ADVISE: [phone]

## 2021-04-14 ENCOUNTER — TREATMENT (OUTPATIENT)
Dept: PHYSICAL THERAPY | Facility: CLINIC | Age: 71
End: 2021-04-14

## 2021-04-14 DIAGNOSIS — G89.29 CHRONIC BILATERAL LOW BACK PAIN WITH RIGHT-SIDED SCIATICA: Primary | ICD-10-CM

## 2021-04-14 DIAGNOSIS — M54.41 CHRONIC BILATERAL LOW BACK PAIN WITH RIGHT-SIDED SCIATICA: Primary | ICD-10-CM

## 2021-04-14 DIAGNOSIS — R35.0 URINARY FREQUENCY: Primary | ICD-10-CM

## 2021-04-14 PROCEDURE — 97530 THERAPEUTIC ACTIVITIES: CPT | Performed by: PHYSICAL THERAPIST

## 2021-04-14 PROCEDURE — 97110 THERAPEUTIC EXERCISES: CPT | Performed by: PHYSICAL THERAPIST

## 2021-04-14 PROCEDURE — 97140 MANUAL THERAPY 1/> REGIONS: CPT | Performed by: PHYSICAL THERAPIST

## 2021-04-14 RX ORDER — DARIFENACIN HYDROBROMIDE 15 MG/1
15 TABLET, EXTENDED RELEASE ORAL DAILY
Qty: 30 TABLET | Refills: 12 | Status: SHIPPED | OUTPATIENT
Start: 2021-04-14 | End: 2021-11-03

## 2021-04-14 NOTE — PROGRESS NOTES
Physical Therapy Daily Progress Note      Patient: Jess Mackay   : 1950  Diagnosis/ICD-10 Code:  Chronic bilateral low back pain with right-sided sciatica [M54.41, G89.29]   Problems Addressed this Visit     None      Visit Diagnoses     Chronic bilateral low back pain with right-sided sciatica    -  Primary      Diagnoses       Codes Comments    Chronic bilateral low back pain with right-sided sciatica    -  Primary ICD-10-CM: M54.41, G89.29  ICD-9-CM: 724.2, 724.3, 338.29          Referring practitioner: Ivette Schaefer MD  Date of Initial Visit: Type: THERAPY  Noted: 3/3/2021  Today's Date: 2021    VISIT#: 12    Subjective No new changes with her back.  Completing exercise regularly.  Did go for a walk at [facility] yesterday but her  struggled with walking downhill.  She does well using her 2 walking sticks.      Objective Completed exercise with focus on balance, hip and core strength.      See Exercise, Manual, and Modality Logs for complete treatment.     Assessment/Plan Pt tolerated tx well with no inc pain.      Progress per Plan of Care         Timed:         Manual Therapy:    15     mins  02572;     Therapeutic Exercise:    15     mins  78007;     Neuromuscular Michael:        mins  87210;    Therapeutic Activity:     20     mins  70213;     Gait Training:           mins  40608;     Ultrasound:          mins  78409;    Ionto                                   mins   20775  Self Care                            mins   82338  Canalith Repos                   mins  35562    Un-Timed:  Electrical Stimulation:         mins  56243 ( );  Dry Needling          mins self-pay  Traction          mins 73003  Low Eval          Mins  69672  Mod Eval          Mins  94403  High Eval                            Mins  93806  Re-Eval                               mins  29333    Timed Treatment:   45   mins   Total Treatment:     55   mins    Palmira Leigh PT  Physical Therapist

## 2021-04-16 ENCOUNTER — TELEPHONE (OUTPATIENT)
Dept: FAMILY MEDICINE CLINIC | Facility: CLINIC | Age: 71
End: 2021-04-16

## 2021-04-19 ENCOUNTER — TREATMENT (OUTPATIENT)
Dept: PHYSICAL THERAPY | Facility: CLINIC | Age: 71
End: 2021-04-19

## 2021-04-19 DIAGNOSIS — M54.41 CHRONIC BILATERAL LOW BACK PAIN WITH RIGHT-SIDED SCIATICA: Primary | ICD-10-CM

## 2021-04-19 DIAGNOSIS — N39.46 MIXED STRESS AND URGE URINARY INCONTINENCE: Primary | ICD-10-CM

## 2021-04-19 DIAGNOSIS — G89.29 CHRONIC BILATERAL LOW BACK PAIN WITH RIGHT-SIDED SCIATICA: Primary | ICD-10-CM

## 2021-04-19 PROCEDURE — 97112 NEUROMUSCULAR REEDUCATION: CPT | Performed by: PHYSICAL THERAPIST

## 2021-04-19 PROCEDURE — 97140 MANUAL THERAPY 1/> REGIONS: CPT | Performed by: PHYSICAL THERAPIST

## 2021-04-19 PROCEDURE — 97530 THERAPEUTIC ACTIVITIES: CPT | Performed by: PHYSICAL THERAPIST

## 2021-04-19 PROCEDURE — 97110 THERAPEUTIC EXERCISES: CPT | Performed by: PHYSICAL THERAPIST

## 2021-04-19 NOTE — PROGRESS NOTES
Physical Therapy Daily Progress Note    VISIT#: 13    Subjective   Jess Mackay reports that she was able to walk farther since she was here last. She is still having some pain in the low back but it is less than before.  Pain Ratin    Objective     See Exercise, Manual, and Modality Logs for complete treatment.     Patient Education: benefits of PT for urinary incontinence, POC    Assessment/Plan   Pt continues to improve activity tolerance and endurance. Pt was able to complete all activities without a rest break and occasional cues for improved posture. Pt is able to walk farther distances in a community setting and is still using a walking stick while walking her dogs. Pt plans to continue therapy for one more week and then possibly DC to HEP. Pt and PTA discussed completing this POC and initiating pelvic health PT at another location.     Progress per Plan of Care and Progress strengthening /stabilization /functional activity          Timed:         Manual Therapy:    13     mins  75411;     Therapeutic Exercise:    15     mins  11246;     Neuromuscular Michael:    10    mins  44790;    Therapeutic Activity:     15     mins  50770;     Gait Training:           mins  41927;     Ultrasound:          mins  26350;    Ionto                                   mins   77028  Self Care                            mins   97020  Canalith Repos                   mins  77945    Un-Timed:  Electrical Stimulation:         mins  83264 ( );  Dry Needling         mins self-pay  Traction          mins 27076  Low Eval          Mins  44888  Mod Eval          Mins  96801  High Eval                            Mins  56720  Re-Eval                               mins  51455    Timed Treatment:   53   mins   Total Treatment:     63   mins    Susan Hitchcock  Physical Therapist Assistant  [place] License # [id number]

## 2021-04-26 ENCOUNTER — TREATMENT (OUTPATIENT)
Dept: PHYSICAL THERAPY | Facility: CLINIC | Age: 71
End: 2021-04-26

## 2021-04-26 DIAGNOSIS — M54.41 CHRONIC BILATERAL LOW BACK PAIN WITH RIGHT-SIDED SCIATICA: Primary | ICD-10-CM

## 2021-04-26 DIAGNOSIS — G89.29 CHRONIC BILATERAL LOW BACK PAIN WITH RIGHT-SIDED SCIATICA: Primary | ICD-10-CM

## 2021-04-26 PROCEDURE — 97110 THERAPEUTIC EXERCISES: CPT | Performed by: PHYSICAL THERAPIST

## 2021-04-26 PROCEDURE — 97530 THERAPEUTIC ACTIVITIES: CPT | Performed by: PHYSICAL THERAPIST

## 2021-04-26 PROCEDURE — 97140 MANUAL THERAPY 1/> REGIONS: CPT | Performed by: PHYSICAL THERAPIST

## 2021-04-26 PROCEDURE — 97112 NEUROMUSCULAR REEDUCATION: CPT | Performed by: PHYSICAL THERAPIST

## 2021-04-26 NOTE — PROGRESS NOTES
Physical Therapy Daily Progress Note        Patient: Jess Mackay   : 1950  Diagnosis/ICD-10 Code:  Chronic bilateral low back pain with right-sided sciatica [M54.41, G89.29]  Referring practitioner: Ivette Schaefer MD  Date of Initial Visit: Type: THERAPY  Noted: 3/3/2021  Today's Date: 2021  Patient seen for 14 sessions.  POC 20.            Patient  20 minutes late.     Subjective :  Pt. Very pleased with progress. She reports that  has noticed how much better/straighter she is walking.     Objective   See Exercise, Manual, and Modality Logs for complete treatment.  Progression as noted.     EDUCATION:  Importance on continuing with HEP after discharge to ensure to maintain progress.     Assessment/Plan:  Pt. Continues to progress and work towards goals.  She remains highly motivated.  Treatment abbreviated due to patient being late.     Progress per Plan of Care    Timed:                                                                          Manual Therapy:            10     mins  98697;                  Therapeutic Exercise:    10     mins  72400;     Neuromuscular Michael:    10    mins  35399;    Therapeutic Activity:       10    mins  40990;     Gait Training:                      mins  03873;     Ultrasound:                          mins  78852;    Ionto                                   mins   93422  Self Care                            mins   87077  Canalith Repos                   mins  36825     Un-Timed:  Electrical Stimulation:         mins  12239 ( );  Dry Needling                      mins self-pay  Traction                               mins 26416  Low Eval                             Mins  54137  Mod Eval                             Mins  21811  High Eval                            Mins  13938  Re-Eval                               mins  83631     Timed Treatment:   40   mins   Total Treatment:     40   mins              Scarlett Goode PTA  Physical Therapist  Assistant

## 2021-04-28 ENCOUNTER — TREATMENT (OUTPATIENT)
Dept: PHYSICAL THERAPY | Facility: CLINIC | Age: 71
End: 2021-04-28

## 2021-04-28 DIAGNOSIS — M54.41 CHRONIC BILATERAL LOW BACK PAIN WITH RIGHT-SIDED SCIATICA: Primary | ICD-10-CM

## 2021-04-28 DIAGNOSIS — G89.29 CHRONIC BILATERAL LOW BACK PAIN WITH RIGHT-SIDED SCIATICA: Primary | ICD-10-CM

## 2021-04-28 PROCEDURE — 97110 THERAPEUTIC EXERCISES: CPT | Performed by: PHYSICAL THERAPIST

## 2021-04-28 PROCEDURE — 97140 MANUAL THERAPY 1/> REGIONS: CPT | Performed by: PHYSICAL THERAPIST

## 2021-04-28 PROCEDURE — 97112 NEUROMUSCULAR REEDUCATION: CPT | Performed by: PHYSICAL THERAPIST

## 2021-04-28 NOTE — PROGRESS NOTES
Physical Therapy Daily Progress Note/ DISCHARGE      Patient: Jess Mackay   : 1950  Diagnosis/ICD-10 Code:  Chronic bilateral low back pain with right-sided sciatica [M54.41, G89.29]   Problems Addressed this Visit     None      Visit Diagnoses     Chronic bilateral low back pain with right-sided sciatica    -  Primary      Diagnoses       Codes Comments    Chronic bilateral low back pain with right-sided sciatica    -  Primary ICD-10-CM: M54.41, G89.29  ICD-9-CM: 724.2, 724.3, 338.29          Referring practitioner: Ivette Schaefer MD  Date of Initial Visit: Type: THERAPY  Noted: 3/3/2021  Today's Date: 2021    VISIT#: 15     Oswestry: 23/50-->46% disability    Subjective Pt stated that she is really pleased with progress.  Walking better and feeling more confident walking her dog outside.  Will be transition to [facility] for pelvic floor physical therapy.  Plan to hold PT for her back.      Objective Completed exercise well.  Good tolerance to mobility.      See Exercise, Manual, and Modality Logs for complete treatment.     Assessment/Plan Pt tolerated tx well with no inc pain.  Plan to DC at this time and transition to [facility] for specialized treatment.        Goals  Plan Goals: STG  Pt I with HEP in 2 weeks. MET  To dec pain to 3-4/10 in 2-3 weeks. MET  To improve juan 30 min exercise without inc pain in 2-3 weeks.  MET  LTG  To dec pain 1-2/10 max in 8-10 weeks. PARTIALLY MET  To tolerate sleep through the night with no inc pain in 8-10 weeks.  PARTIALLY MET  To tolerate walking 5 min without inc pain/loss of balance in 8-10 weeks.  MET    Progress per Plan of Care and Plan to DC with HEP         Timed:         Manual Therapy:    10     mins  96024;     Therapeutic Exercise:    15     mins  74113;     Neuromuscular Michael:    15    mins  95155;    Therapeutic Activity:     5     mins  69940;     Gait Training:           mins  19497;     Ultrasound:          mins  98037;    Ionto                                    mins   02812  Self Care                            mins   35674  Canalith Repos                   mins  52074    Un-Timed:  Electrical Stimulation:         mins  51051 ( );  Dry Needling          mins self-pay  Traction          mins 02649  Low Eval          Mins  64871  Mod Eval          Mins  96062  High Eval                            Mins  54105  Re-Eval                               mins  19729    Timed Treatment:   45   mins   Total Treatment:     55   mins    Palmira Leigh PT  Physical Therapist

## 2021-05-03 ENCOUNTER — TREATMENT (OUTPATIENT)
Dept: PHYSICAL THERAPY | Facility: CLINIC | Age: 71
End: 2021-05-03

## 2021-05-03 DIAGNOSIS — N81.89 PELVIC FLOOR WEAKNESS IN FEMALE: ICD-10-CM

## 2021-05-03 DIAGNOSIS — N39.46 MIXED STRESS AND URGE INCONTINENCE: Primary | ICD-10-CM

## 2021-05-03 PROCEDURE — 97161 PT EVAL LOW COMPLEX 20 MIN: CPT | Performed by: PHYSICAL THERAPIST

## 2021-05-03 PROCEDURE — 97530 THERAPEUTIC ACTIVITIES: CPT | Performed by: PHYSICAL THERAPIST

## 2021-05-03 NOTE — PROGRESS NOTES
Physical Therapy Initial Evaluation and Plan of Care    Patient: Jess Mackay   : 1950  Diagnosis/ICD-10 Code:  Mixed stress and urge incontinence [N39.46]  Referring practitioner: Ivette Schaefer MD  Date of Initial Visit: 5/3/2021  Today's Date: 5/3/2021  Patient seen for 1 session           Subjective Questionnaire: PFDI-20: 33 points    Subjective Evaluation    History of Present Illness  Mechanism of injury: Patient presents to physical therapy with orders to address mixed urinary incontinence.  She was recently discharged from physical therapy at our [place] office for treatment of LBP from 3/3/21-21.  She states that her back pain has improved significantly but she can't stand up straight.  She reports significant scoliosis and wears a brace daily at work. She states that she has a long standing history of urinary frequency.   She states that she can't go more than an hour without needing to urinate.  Sometimes she doesn't feel like she can make it to the restroom in time.  Sometimes she leaks a little urine but she states that the volume has been small leaks.   She was prescribed some medication but her insurance wasn't covering well so she elected to try physical therapy first.   She states that she also has stress incontinence with coughing, sneezing, laughing and lifting. She reports nocturia at least every 2 hours so she isn't sleeping well at all.      She denies any issues with controlling her feces.  She denies any current symptoms or history of pelvic pain and dyspareunia.      She reports a history of 2 pregnancies and gave birth via  both times.  She reports long standing history of urinary frequency but the incontinence started about 4-5 years ago.  She uses pads/pantyliners for protection and goes through 2 per day.   She does report some leakage at night.        Patient Occupation:  currently, retired teacher Pain  Current pain ratin  At best pain  ratin  At worst pain ratin  Location: Low back    Social Support  Lives in: multiple-level home (Currently plans to sell her home and live/travel in an RV)  Lives with: spouse    Diagnostic Tests  X-ray: abnormal    Treatments  Previous treatment: physical therapy and chiropractic (3/3/21-21 LBP)  Patient Goals  Patient goals for therapy: increased strength  Patient goal: Improved urinary control       Objective          Static Posture   General Observations  Left leg length discrepancy, shifted left and scoliosis.     Lumbar Spine   Lumbar spine (Left): Shifted.   Lumbar spine (Right): Rotated.     Comments  Heel lift on left shoe    Palpation     Additional Palpation Details  Diastasis Recti: present 2 finger widths distal to umbilicus    Active Range of Motion     Lumbar   Flexion: 65 degrees   Extension: 5 degrees   Left lateral flexion: 35 degrees   Right lateral flexion: 3 degrees     Passive Range of Motion   Left Hip   External rotation (90/90): 20 degrees   Internal rotation (90/90): 30 degrees     Right Hip   External rotation (90/90): 35 degrees   Internal rotation (90/90): 10 degrees     Strength/Myotome Testing     Left Hip   Planes of Motion   Flexion: 4+  Extension: 3+  Abduction: 4  External rotation: 4  Internal rotation: 4    Right Hip   Planes of Motion   Flexion: 4+  Extension: 3+  Abduction: 4  External rotation: 4  Internal rotation: 4    Left Knee   Flexion: 5  Extension: 5    Right Knee   Flexion: 5  Extension: 5    Left Ankle/Foot   Dorsiflexion: 5    Right Ankle/Foot   Dorsiflexion: 5    Muscle Activation   Patient able to activate left transverse abdominals and right transverse abdominals.         Assessment & Plan     Assessment  Impairments: abnormal coordination, abnormal muscle firing, abnormal muscle tone, abnormal or restricted ROM, activity intolerance, impaired physical strength and lacks appropriate home exercise program  Assessment details: The patient is a 71 y.o.  female who presents to physical therapy today for mixed urinary incontinence. Upon initial evaluation, the patient demonstrates the impairments listed above. Due to these impairments, the patient is unable to control her bladder and leaks urine. The patient would benefit from skilled PT services to address functional limitations and impairments and to improve patient quality of life.    Prognosis: good  Functional Limitations: carrying objects, lifting, sleeping, walking and standing  Goals  Plan Goals: STG's: 3 weeks  Patient will report > 25% reduction in overall pain   Patient will report > 40% improvement in urinary symptoms  Patient will be able to perform HEP with minimal verbal cues    LTG's: By discharge  Patient will report a decrease in pain to < 3/10  Patient will report >70% improvement in urinary symptoms   Patient will report an elimination of urinary urgency   Patient will report an elimination of nocturia  Patient will be able to tolerate sitting >60 minutes without increased pain   Patient will be able to tolerate standing >25 minutes to increase tolerance to work/home activities with decreased pain   Patient will decrease voiding frequency to once every 3-4 hours  Patient will be independent with HEP      Plan  Therapy options: will be seen for skilled physical therapy services  Planned therapy interventions: abdominal trunk stabilization, manual therapy, neuromuscular re-education, body mechanics training, flexibility, functional ROM exercises, home exercise program, postural training, soft tissue mobilization, strengthening, stretching and therapeutic activities  Duration in visits: 12  Treatment plan discussed with: patient      History # of Personal Factors and/or Comorbidities: MODERATE (1-2)  Examination of Body System(s): # of elements: MODERATE (3)  Clinical Presentation: STABLE   Clinical Decision Making: LOW       Timed:         Manual Therapy:         mins  19977;     Therapeutic  Exercise:         mins  09538;     Neuromuscular Michael:        mins  44830;    Therapeutic Activity:     15     mins  07502;     Gait Training:           mins  82162;     Ultrasound:          mins  12446;    Ionto                                   mins   54803  Self Care                            mins   24858  Canalith Repos         mins 40192      Un-Timed:  Electrical Stimulation:         mins  19194 ( );  Dry Needling          mins self-pay  Traction          mins 10683  Low Eval     30     Mins  27627  Mod Eval          Mins  29018  High Eval                            Mins  84850  Re-Eval                               mins  82936        Timed Treatment:  15    mins   Total Treatment:     45   mins    PT SIGNATURE: Mira Alves PT   DATE TREATMENT INITIATED: 5/3/2021    Medicare Initial Certification  Certification Period: 8/1/2021  I certify that the therapy services are furnished while this patient is under my care.  The services outlined above are required by this patient, and will be reviewed every 90 days.     PHYSICIAN: Ivette Schaefer MD      DATE:     Please sign and return via fax to [phone]. Thank you, [facility].

## 2021-05-14 ENCOUNTER — TELEPHONE (OUTPATIENT)
Dept: PHYSICAL THERAPY | Facility: CLINIC | Age: 71
End: 2021-05-14

## 2021-05-14 NOTE — TELEPHONE ENCOUNTER
CALLED PT TO CONFIRM MON APPT. PT HAD TO CXL STATING SHE WAS NOT AWARE SHE HAD AN APPT. CONFIRMED 5-24.

## 2021-05-24 ENCOUNTER — TREATMENT (OUTPATIENT)
Dept: PHYSICAL THERAPY | Facility: CLINIC | Age: 71
End: 2021-05-24

## 2021-05-24 DIAGNOSIS — N39.46 MIXED STRESS AND URGE INCONTINENCE: Primary | ICD-10-CM

## 2021-05-24 DIAGNOSIS — N81.89 PELVIC FLOOR WEAKNESS IN FEMALE: ICD-10-CM

## 2021-05-24 PROCEDURE — 97530 THERAPEUTIC ACTIVITIES: CPT | Performed by: PHYSICAL THERAPIST

## 2021-05-24 NOTE — PROGRESS NOTES
Physical Therapy Daily Progress Note    VISIT#: 2    Subjective   Jess Mackay reports that she is doing well today.  She denies any pain and states that she has been working on her exercises 1x/day since her PT eval.  Pain Rating (0-10): 0    Objective     See Exercise, Manual, and Modality Logs for complete treatment.     Patient Education: Reviewed HEP and treatment plan    Assessment & Plan     Assessment  Assessment details: Patient tolerated further exercise progression well today without increased back pain.  She is independent with current HEP but would benefit from further progression of core and pelvic floor strengthening.           Progress per Plan of Care and Progress strengthening /stabilization /functional activity.  Patient will be traveling across the country in June.  Will schedule when in town.            Timed:         Manual Therapy:         mins  88658;     Therapeutic Exercise:         mins  48125;     Neuromuscular Michael:        mins  68010;    Therapeutic Activity:     38     mins  65392;     Gait Training:           mins  98192;     Ultrasound:          mins  87219;    Ionto                                   mins   55250  Self Care                            mins   00054  Canalith Repos                   mins  63966    Un-Timed:  Electrical Stimulation:         mins  07866 ( );  Dry Needling          mins self-pay  Traction          mins 29686  Low Eval          Mins  70450  Mod Eval          Mins  33322  High Eval                            Mins  76747  Re-Eval                               mins  15262    Timed Treatment:   38   mins   Total Treatment:     38   mins    Mira Alves PT  Physical Therapist

## 2021-06-02 ENCOUNTER — TREATMENT (OUTPATIENT)
Dept: PHYSICAL THERAPY | Facility: CLINIC | Age: 71
End: 2021-06-02

## 2021-06-02 DIAGNOSIS — N81.89 PELVIC FLOOR WEAKNESS IN FEMALE: ICD-10-CM

## 2021-06-02 DIAGNOSIS — N39.46 MIXED STRESS AND URGE INCONTINENCE: Primary | ICD-10-CM

## 2021-06-02 PROCEDURE — 97530 THERAPEUTIC ACTIVITIES: CPT | Performed by: PHYSICAL THERAPIST

## 2021-06-02 NOTE — PROGRESS NOTES
Physical Therapy Daily Progress Note    VISIT#: 3    Subjective   Jess Mackay reports that she is doing better.  She is feeling less pain and reporting improved bladder control.  She is working on her exercises 2x/wk.  She will continue to work this week and then she will be leaving Monday for a 3 week trip.     Pain Rating (0-10): 0    Objective     See Exercise, Manual, and Modality Logs for complete treatment.     Patient Education: Reviewed and updated HEP.  Issued [employer] handout, access code: [email or web address]    Assessment & Plan     Assessment  Assessment details: Patient tolerated further exercise progression well without increased back pain.  She is responding well to treatment and making significant progress toward her goals.           Progress per Plan of Care and Progress strengthening /stabilization /functional activity .  Patient will be out of town for the next 3 weeks.          Timed:         Manual Therapy:         mins  45872;     Therapeutic Exercise:         mins  32468;     Neuromuscular Michael:        mins  46404;    Therapeutic Activity:     45     mins  54585;     Gait Training:           mins  24568;     Ultrasound:          mins  38297;    Ionto                                   mins   67921  Self Care                            mins   34078  Canalith Repos                   mins  58970    Un-Timed:  Electrical Stimulation:         mins  13852 ( );  Dry Needling          mins self-pay  Traction          mins 11672  Low Eval          Mins  16049  Mod Eval          Mins  18395  High Eval                            Mins  73735  Re-Eval                               mins  99633    Timed Treatment:   40   mins   Total Treatment:     45   mins    Mira Alves PT  Physical Therapist

## 2021-06-28 ENCOUNTER — TREATMENT (OUTPATIENT)
Dept: PHYSICAL THERAPY | Facility: CLINIC | Age: 71
End: 2021-06-28

## 2021-06-28 DIAGNOSIS — N81.89 PELVIC FLOOR WEAKNESS IN FEMALE: ICD-10-CM

## 2021-06-28 DIAGNOSIS — N39.46 MIXED STRESS AND URGE INCONTINENCE: Primary | ICD-10-CM

## 2021-06-28 PROCEDURE — 97530 THERAPEUTIC ACTIVITIES: CPT | Performed by: PHYSICAL THERAPIST

## 2021-06-28 NOTE — PROGRESS NOTES
Physical Therapy Daily Progress Note    VISIT#: 4    Subjective   Jess Mackay reports that she did well while she was out of town. She states that her urinary symptoms are better when she can be more consistent with her exercises.  Pain Rating (0-10): 0    Objective     See Exercise, Manual, and Modality Logs for complete treatment.     Patient Education: Reviewed and updated HEP    Assessment & Plan     Assessment  Assessment details: Patient is progressing well towards her goals at this time.  She is demonstrating compliance with her current HEP and reporting a reduction in her overall symptoms.     Goals  Plan Goals: STG's: 3 weeks  Patient will report > 25% reduction in overall pain - MET  Patient will report > 40% improvement in urinary symptoms - PARTIALLY MET  Patient will be able to perform HEP with minimal verbal cues - MET    LTG's: By discharge  Patient will report a decrease in pain to < 3/10- PARTIALLY MET  Patient will report >70% improvement in urinary symptoms - PARTIALLY MET  Patient will report an elimination of urinary urgency - PARTIALLY MET  Patient will report an elimination of nocturia- PARTIALLY MET  Patient will be able to tolerate sitting >60 minutes without increased pain - PARTIALLY MET  Patient will be able to tolerate standing >25 minutes to increase tolerance to work/home activities with decreased pain - PARTIALLY MET  Patient will decrease voiding frequency to once every 3-4 hours- PARTIALLY MET  Patient will be independent with HEP- PARTIALLY MET      Plan  Therapy options: will be seen for skilled physical therapy services  Duration in visits: 8  Treatment plan discussed with: patient          Progress per Plan of Care and Progress strengthening /stabilization /functional activity.  Patient will be seen twice this week before she travels out of town again.              Timed:         Manual Therapy:         mins  99712;     Therapeutic Exercise:         mins  95929;      Neuromuscular Michael:        mins  17687;    Therapeutic Activity:     45     mins  21481;     Gait Training:           mins  38115;     Ultrasound:          mins  90103;    Ionto                                   mins   08198  Self Care                            mins   31431  Canalith Repos                   mins  90474    Un-Timed:  Electrical Stimulation:         mins  57563 ( );  Dry Needling          mins self-pay  Traction          mins 13096  Low Eval          Mins  87706  Mod Eval          Mins  05772  High Eval                            Mins  97250  Re-Eval                               mins  10853    Timed Treatment:   45   mins   Total Treatment:     45   mins    Mira Alves PT  Physical Therapist

## 2021-06-30 ENCOUNTER — TREATMENT (OUTPATIENT)
Dept: PHYSICAL THERAPY | Facility: CLINIC | Age: 71
End: 2021-06-30

## 2021-06-30 DIAGNOSIS — N81.89 PELVIC FLOOR WEAKNESS IN FEMALE: ICD-10-CM

## 2021-06-30 DIAGNOSIS — N39.46 MIXED STRESS AND URGE INCONTINENCE: Primary | ICD-10-CM

## 2021-06-30 PROCEDURE — 97530 THERAPEUTIC ACTIVITIES: CPT | Performed by: PHYSICAL THERAPIST

## 2021-06-30 NOTE — PROGRESS NOTES
Physical Therapy Daily Progress Note    VISIT#: 5    Subjective   Jess Mackay reports no new complaints today.  Pain Rating (0-10): 0    Objective     See Exercise, Manual, and Modality Logs for complete treatment.     Patient Education: Reviewed swiss ball exercises and where to purchase    Assessment & Plan     Assessment  Assessment details: Patient has remained painfree with her exercises and is demonstrating improvements in core and pelvic floor strength.  She is reporting an improvement in bladder control.       Progress per Plan of Care and Progress strengthening /stabilization /functional activity            Timed:         Manual Therapy:         mins  84232;     Therapeutic Exercise:         mins  41084;     Neuromuscular Michael:        mins  18978;    Therapeutic Activity:     45     mins  06801;     Gait Training:           mins  88304;     Ultrasound:          mins  78308;    Ionto                                   mins   93413  Self Care                            mins   27340  Canalith Repos                   mins  22875    Un-Timed:  Electrical Stimulation:         mins  56262 ( );  Dry Needling          mins self-pay  Traction          mins 92829  Low Eval          Mins  78453  Mod Eval          Mins  59778  High Eval                            Mins  63797  Re-Eval                               mins  52534    Timed Treatment:   45   mins   Total Treatment:     45   mins    Mira Alves PT  Physical Therapist

## 2021-07-06 DIAGNOSIS — Z87.891 PERSONAL HISTORY OF NICOTINE DEPENDENCE: ICD-10-CM

## 2021-07-06 DIAGNOSIS — IMO0001 LUNG NODULE < 6CM ON CT: Primary | ICD-10-CM

## 2021-07-07 ENCOUNTER — TREATMENT (OUTPATIENT)
Dept: PHYSICAL THERAPY | Facility: CLINIC | Age: 71
End: 2021-07-07

## 2021-07-07 DIAGNOSIS — N39.46 MIXED STRESS AND URGE INCONTINENCE: Primary | ICD-10-CM

## 2021-07-07 DIAGNOSIS — N81.89 PELVIC FLOOR WEAKNESS IN FEMALE: ICD-10-CM

## 2021-07-07 PROCEDURE — 97530 THERAPEUTIC ACTIVITIES: CPT | Performed by: PHYSICAL THERAPIST

## 2021-07-07 NOTE — PROGRESS NOTES
Physical Therapy Daily Progress Note    VISIT#: 6    Subjective   Jess Mackay reports that she will be leaving Monday to head out of town again.   Pain Rating (0-10): 0    Objective     See Exercise, Manual, and Modality Logs for complete treatment.     Patient Education: Reviewed HEP and instructed to continue with current plan    Assessment & Plan     Assessment  Assessment details: Patient is progressing well with an improvement in bladder control and continued management of her back pain.           Progress per Plan of Care and Progress strengthening /stabilization /functional activity            Timed:         Manual Therapy:         mins  18583;     Therapeutic Exercise:         mins  52969;     Neuromuscular Michael:        mins  79198;    Therapeutic Activity:     45     mins  26104;     Gait Training:           mins  97649;     Ultrasound:          mins  87922;    Ionto                                   mins   61043  Self Care                            mins   58945  Canalith Repos                   mins  22742    Un-Timed:  Electrical Stimulation:         mins  81476 ( );  Dry Needling          mins self-pay  Traction          mins 37933  Low Eval          Mins  54631  Mod Eval          Mins  57843  High Eval                            Mins  16139  Re-Eval                               mins  15423    Timed Treatment:   45   mins   Total Treatment:     45   mins    Mira Alves PT  Physical Therapist

## 2021-07-21 ENCOUNTER — HOSPITAL ENCOUNTER (OUTPATIENT)
Dept: CT IMAGING | Facility: HOSPITAL | Age: 71
Discharge: HOME | End: 2021-07-21
Admitting: FAMILY MEDICINE

## 2021-07-21 DIAGNOSIS — IMO0001 LUNG NODULE < 6CM ON CT: ICD-10-CM

## 2021-07-21 DIAGNOSIS — Z87.891 PERSONAL HISTORY OF NICOTINE DEPENDENCE: ICD-10-CM

## 2021-07-21 PROCEDURE — 71271 CT THORAX LUNG CANCER SCR C-: CPT

## 2021-07-23 ENCOUNTER — TELEPHONE (OUTPATIENT)
Dept: FAMILY MEDICINE CLINIC | Facility: CLINIC | Age: 71
End: 2021-07-23

## 2021-07-27 ENCOUNTER — TELEPHONE (OUTPATIENT)
Dept: PHYSICAL THERAPY | Facility: CLINIC | Age: 71
End: 2021-07-27

## 2021-07-27 NOTE — TELEPHONE ENCOUNTER
CALLED PT FOR TRACKING. PT SAID HE WAS GOING OOT FOR 3 WKS FOR ANOTHER TRIP. WILL CALL  WHEN SHE GETS BACK.

## 2021-08-23 ENCOUNTER — DOCUMENTATION (OUTPATIENT)
Dept: PHYSICAL THERAPY | Facility: CLINIC | Age: 71
End: 2021-08-23

## 2021-08-23 NOTE — PROGRESS NOTES
Discharge Summary  Discharge Summary from Physical Therapy Report      Dates  PT visit: 5/3/21-7/7/21  Number of Visits: 6     Discharge Status of Patient: See MD Note dated 6/28/21    Goals: Partially Met    Discharge Plan: Continue with current home exercise program as instructed  Patient to return to referring/providing physician    Comments Patient did not return for ongoing care so their chart will be discharged at this time.      Date of Discharge 8/23/21        Mira Alves, PT  Physical Therapist

## 2021-11-03 ENCOUNTER — OFFICE VISIT (OUTPATIENT)
Dept: FAMILY MEDICINE CLINIC | Facility: CLINIC | Age: 71
End: 2021-11-03

## 2021-11-03 VITALS
TEMPERATURE: 97.3 F | DIASTOLIC BLOOD PRESSURE: 76 MMHG | SYSTOLIC BLOOD PRESSURE: 138 MMHG | HEIGHT: 62 IN | OXYGEN SATURATION: 98 % | WEIGHT: 171.2 LBS | HEART RATE: 77 BPM | RESPIRATION RATE: 18 BRPM | BODY MASS INDEX: 31.5 KG/M2

## 2021-11-03 DIAGNOSIS — Z91.81 AT LOW RISK FOR FALL: ICD-10-CM

## 2021-11-03 DIAGNOSIS — I10 BENIGN HYPERTENSION: Primary | ICD-10-CM

## 2021-11-03 DIAGNOSIS — E66.09 CLASS 1 OBESITY DUE TO EXCESS CALORIES WITH SERIOUS COMORBIDITY AND BODY MASS INDEX (BMI) OF 31.0 TO 31.9 IN ADULT: ICD-10-CM

## 2021-11-03 DIAGNOSIS — Z87.891 HISTORY OF TOBACCO USE: ICD-10-CM

## 2021-11-03 DIAGNOSIS — H61.23 BILATERAL IMPACTED CERUMEN: ICD-10-CM

## 2021-11-03 PROBLEM — E66.811 CLASS 1 OBESITY DUE TO EXCESS CALORIES WITH SERIOUS COMORBIDITY AND BODY MASS INDEX (BMI) OF 31.0 TO 31.9 IN ADULT: Status: ACTIVE | Noted: 2021-11-03

## 2021-11-03 PROCEDURE — 99213 OFFICE O/P EST LOW 20 MIN: CPT | Performed by: FAMILY MEDICINE

## 2021-11-03 PROCEDURE — 69209 REMOVE IMPACTED EAR WAX UNI: CPT | Performed by: FAMILY MEDICINE

## 2021-11-03 NOTE — PROGRESS NOTES
Answers for HPI/ROS submitted by the patient on 10/27/2021  Please describe your symptoms.: Left ear is stopped up  Have you had these symptoms before?: Yes  How long have you been having these symptoms?: Greater than 2 weeks  Please list any medications you are currently taking for this condition.: N/A  Please describe any probable cause for these symptoms. : Water in ear, perhaps.  What is the primary reason for your visit?: Other    Chief Complaint  Earache and Hypertension    Subjective     CC  Problem List  Visit Diagnosis   Encounters  Notes  Medications  Labs  Result Review Imaging  Media    Jess Mackay presents to [facility] for   Jess Mackay declines flu vaccine at this time. The protection afforded vs the risk of life threatening secondary infection was explained. She is encouraged to reconsider.      Earache   There is pain in the left ear. This is a new problem. The current episode started 1 to 4 weeks ago (3-4 weeks). The problem occurs constantly. The problem has been unchanged. There has been no fever. Pertinent negatives include no abdominal pain, coughing, ear discharge, headaches, rhinorrhea or sore throat. She has tried heat packs and ear drops for the symptoms. The treatment provided no relief.   Hypertension  This is a chronic problem. The current episode started more than 1 year ago. The problem is uncontrolled. Pertinent negatives include no chest pain, headaches, orthopnea, palpitations, peripheral edema, PND, shortness of breath or sweats. Risk factors for coronary artery disease include obesity, post-menopausal state and family history. Current antihypertension treatment includes calcium channel blockers and diuretics. The current treatment provides moderate improvement.       Review of Systems   Constitutional: Negative for fatigue and fever.   HENT: Positive for ear pain. Negative for ear discharge, rhinorrhea and sore throat.   "  Respiratory: Negative for cough and shortness of breath.    Cardiovascular: Negative for chest pain, palpitations, orthopnea and PND.   Gastrointestinal: Negative for abdominal pain and nausea.   Endocrine: Negative for cold intolerance, heat intolerance, polydipsia and polyuria.   Hematological: Negative for adenopathy. Does not bruise/bleed easily.        Objective   Vital Signs:   /76 (BP Location: Left arm, Patient Position: Sitting, Cuff Size: Adult)   Pulse 77   Temp 97.3 °F (36.3 °C) (Temporal)   Resp 18   Ht 157.5 cm (62\")   Wt 77.7 kg (171 lb 3.2 oz)   SpO2 98% Comment: Room air  BMI 31.31 kg/m²     Physical Exam  Constitutional:       General: She is not in acute distress.  HENT:      Right Ear: Tympanic membrane and external ear normal. There is impacted cerumen.      Left Ear: Tympanic membrane and external ear normal. There is impacted cerumen ( irrigated bilterally until clear with resolution of sxs.  TM normal afterwards. ).   Cardiovascular:      Rate and Rhythm: Normal rate and regular rhythm.      Heart sounds: No murmur heard.      Pulmonary:      Effort: Pulmonary effort is normal.      Breath sounds: Normal breath sounds.   Musculoskeletal:      Cervical back: Neck supple.   Lymphadenopathy:      Cervical: No cervical adenopathy.   Neurological:      Mental Status: She is alert.        Result Review :Labs  Result Review  Imaging  Med Tab  Media          Ear Cerumen Removal    Date/Time: 11/3/2021 10:47 PM  Performed by: Ivette Schaefer MD  Authorized by: Ivette Schaefer MD     Anesthesia:  Local Anesthetic: none  Ceruminolytics applied: Ceruminolytics applied prior to the procedure.  Location details: left ear and right ear  Patient tolerance: patient tolerated the procedure well with no immediate complications  Comments: Ears were irrigated until clear.     Sedation:  Patient sedated: no              Assessment and Plan CC Problem List  Visit Diagnosis  ROS  Review " (Popup)  Health Maintenance  Quality  BestPractice  Medications  SmartSets  SnapShot Encounters  Media  Problem List Items Addressed This Visit        High    Benign hypertension - Primary    Overview     Controlled compliant         Current Assessment & Plan     Hypertension is improving with treatment.  Continue current treatment regimen.  Blood pressure will be reassessed in 3 months.            Low    History of tobacco use    Overview     1 ppd x 35 yrs.   Low dose CT ordered. 11/30/2020            Unprioritized    Class 1 obesity due to excess calories with serious comorbidity and body mass index (BMI) of 31.0 to 31.9 in adult    Overview     Healthy diet and regular exercise encouraged.            Other Visit Diagnoses     Bilateral impacted cerumen        rrigated. Ear wax softnee OTC, follow up should sxs recurr.     Relevant Orders    Ear Cerumen Removal (Completed)    At low risk for fall              Follow Up Instructions Charge Capture  Follow-up Communications  Return in 1 year (on 11/3/2022).  Patient was given instructions and counseling regarding her condition or for health maintenance advice. Please see specific information pulled into the AVS if appropriate.

## 2021-11-14 NOTE — PATIENT INSTRUCTIONS

## 2021-11-30 ENCOUNTER — OFFICE VISIT (OUTPATIENT)
Dept: FAMILY MEDICINE CLINIC | Facility: CLINIC | Age: 71
End: 2021-11-30

## 2021-11-30 VITALS
RESPIRATION RATE: 18 BRPM | OXYGEN SATURATION: 95 % | TEMPERATURE: 97.3 F | SYSTOLIC BLOOD PRESSURE: 138 MMHG | HEART RATE: 99 BPM | WEIGHT: 169.6 LBS | BODY MASS INDEX: 30.05 KG/M2 | HEIGHT: 63 IN | DIASTOLIC BLOOD PRESSURE: 80 MMHG

## 2021-11-30 DIAGNOSIS — Z87.891 HISTORY OF TOBACCO USE: ICD-10-CM

## 2021-11-30 DIAGNOSIS — I10 BENIGN HYPERTENSION: ICD-10-CM

## 2021-11-30 DIAGNOSIS — J01.01 ACUTE RECURRENT MAXILLARY SINUSITIS: ICD-10-CM

## 2021-11-30 DIAGNOSIS — Z91.81 AT LOW RISK FOR FALL: ICD-10-CM

## 2021-11-30 DIAGNOSIS — E66.09 CLASS 1 OBESITY DUE TO EXCESS CALORIES WITH SERIOUS COMORBIDITY AND BODY MASS INDEX (BMI) OF 30.0 TO 30.9 IN ADULT: ICD-10-CM

## 2021-11-30 DIAGNOSIS — R05.9 COUGH IN ADULT: Primary | ICD-10-CM

## 2021-11-30 PROCEDURE — 99213 OFFICE O/P EST LOW 20 MIN: CPT | Performed by: FAMILY MEDICINE

## 2021-11-30 RX ORDER — PREDNISONE 10 MG/1
10 TABLET ORAL TAKE AS DIRECTED
Qty: 18 TABLET | Refills: 0 | Status: SHIPPED | OUTPATIENT
Start: 2021-11-30 | End: 2021-12-11 | Stop reason: HOSPADM

## 2021-11-30 RX ORDER — DOXYCYCLINE 100 MG/1
100 CAPSULE ORAL 2 TIMES DAILY
Qty: 20 CAPSULE | Refills: 0 | Status: SHIPPED | OUTPATIENT
Start: 2021-11-30 | End: 2021-12-11 | Stop reason: HOSPADM

## 2021-11-30 RX ORDER — FEXOFENADINE HCL 180 MG/1
180 TABLET ORAL DAILY
COMMUNITY

## 2021-12-01 LAB
LABCORP SARS-COV-2, NAA 2 DAY TAT: NORMAL
SARS-COV-2 RNA RESP QL NAA+PROBE: DETECTED

## 2021-12-02 NOTE — PROGRESS NOTES
Spoke with Virginia, per Dr Olive valentine  testing was positive. Complete steroids and doxycycline. Virginia  has been taking a ASA daily for years. She also takes zinc and vitamin D. Virginia has been  checking her oxygen level  staying about  97%. Discussed monoclonal antibody infusion would need to be done with in 10 days of  positive test results, which would end  12/9/2021. Virginia reports that she is feeling better and does not think she wants infusion, but will contact us with in time period if she changes her mind.

## 2021-12-03 ENCOUNTER — TELEPHONE (OUTPATIENT)
Dept: FAMILY MEDICINE CLINIC | Facility: CLINIC | Age: 71
End: 2021-12-03

## 2021-12-03 DIAGNOSIS — U07.1 COVID: Primary | ICD-10-CM

## 2021-12-03 DIAGNOSIS — J01.01 ACUTE RECURRENT MAXILLARY SINUSITIS: ICD-10-CM

## 2021-12-03 RX ORDER — BENZONATATE 100 MG/1
100 CAPSULE ORAL 3 TIMES DAILY PRN
Qty: 30 CAPSULE | Refills: 0 | Status: SHIPPED | OUTPATIENT
Start: 2021-12-03 | End: 2022-06-22

## 2021-12-03 RX ORDER — DOXYCYCLINE 100 MG/1
CAPSULE ORAL
Qty: 20 CAPSULE | Refills: 0 | OUTPATIENT
Start: 2021-12-03

## 2021-12-03 NOTE — TELEPHONE ENCOUNTER
Caller: Jess Mackay    Relationship: Self    Best call back number: [phone]     What medication are you requesting: COUGH MEDICINE    What are your current symptoms: COUGHING    If a prescription is needed, what is your preferred pharmacy and phone number:  [id number] - [place], [place] - [address] - [phone] [facility] [phone] FX    Additional notes: PATIENT SAW DR. ALLEN ON 11/30 AND SHE WAS OFFERED COUGH MEDICINE. SHE WASN'T COUGHING MUCH THEN BUT IS NOW AND HAS REQUESTED FOR DR. ALLEN TO SEND THIS IN.     PATIENT ALSO MENTIONED SHE IS NERVOUS ABOUT THE STEROID THAT WAS PRESCRIBED TO HER BECAUSE SHE HAS HAD REACTIONS TO IT IN THE PAST. SHE HAS REQUESTED TO SPEAK WITH DR. ALLEN OR HER ASSISTANT TO DISCUSS THIS.

## 2021-12-03 NOTE — TELEPHONE ENCOUNTER
Virginia  requested cough rx. Per Dr Olive lieberman  were sent to pharmacy. Call if symptoms do not improve.

## 2021-12-06 ENCOUNTER — TELEPHONE (OUTPATIENT)
Dept: FAMILY MEDICINE CLINIC | Facility: CLINIC | Age: 71
End: 2021-12-06

## 2021-12-06 DIAGNOSIS — U07.1 COVID: Primary | ICD-10-CM

## 2021-12-06 RX ORDER — DIPHENHYDRAMINE HCL 25 MG
50 TABLET ORAL ONCE AS NEEDED
Status: CANCELLED | OUTPATIENT
Start: 2021-12-07

## 2021-12-06 RX ORDER — EPINEPHRINE 1 MG/ML
0.3 INJECTION, SOLUTION INTRAMUSCULAR; SUBCUTANEOUS AS NEEDED
Status: CANCELLED | OUTPATIENT
Start: 2021-12-07

## 2021-12-06 RX ORDER — DIPHENHYDRAMINE HYDROCHLORIDE 50 MG/ML
50 INJECTION INTRAMUSCULAR; INTRAVENOUS ONCE AS NEEDED
Status: CANCELLED | OUTPATIENT
Start: 2021-12-07

## 2021-12-06 RX ORDER — METHYLPREDNISOLONE SODIUM SUCCINATE 125 MG/2ML
125 INJECTION, POWDER, LYOPHILIZED, FOR SOLUTION INTRAMUSCULAR; INTRAVENOUS AS NEEDED
Status: CANCELLED | OUTPATIENT
Start: 2021-12-07

## 2021-12-06 RX ORDER — SODIUM CHLORIDE 9 MG/ML
30 INJECTION, SOLUTION INTRAVENOUS ONCE
Status: CANCELLED | OUTPATIENT
Start: 2021-12-07

## 2021-12-06 NOTE — TELEPHONE ENCOUNTER
Spoke with Virginia she and her  would like infusion arranged . Forms were completed and faxed to [facility]. I spoke with nurse at [facility] department, they will call and arrange the appointment with patient maybe today or Tuesday.

## 2021-12-06 NOTE — TELEPHONE ENCOUNTER
Patient called and states that she wants the antibiotic infusion for covid. Please contact her at . Thanks Moira

## 2021-12-07 ENCOUNTER — HOSPITAL ENCOUNTER (OUTPATIENT)
Dept: INFUSION THERAPY | Facility: HOSPITAL | Age: 71
Discharge: HOME | End: 2021-12-07
Admitting: FAMILY MEDICINE

## 2021-12-07 VITALS
SYSTOLIC BLOOD PRESSURE: 150 MMHG | OXYGEN SATURATION: 97 % | DIASTOLIC BLOOD PRESSURE: 65 MMHG | RESPIRATION RATE: 16 BRPM | TEMPERATURE: 98.9 F | HEART RATE: 56 BPM

## 2021-12-07 DIAGNOSIS — U07.1 COVID: Primary | ICD-10-CM

## 2021-12-07 PROCEDURE — 96361 HYDRATE IV INFUSION ADD-ON: CPT

## 2021-12-07 PROCEDURE — M0243 CASIRIVI AND IMDEVI INFUSION: HCPCS | Performed by: FAMILY MEDICINE

## 2021-12-07 PROCEDURE — 96365 THER/PROPH/DIAG IV INF INIT: CPT

## 2021-12-07 PROCEDURE — 25010000002 INJECTION, CASIRIVIMAB AND IMDEVIMAB, 1200 MG: Performed by: FAMILY MEDICINE

## 2021-12-07 RX ORDER — EPINEPHRINE 1 MG/ML
0.3 INJECTION, SOLUTION, CONCENTRATE INTRAVENOUS AS NEEDED
Status: DISCONTINUED | OUTPATIENT
Start: 2021-12-07 | End: 2021-12-09 | Stop reason: HOSPADM

## 2021-12-07 RX ORDER — DIPHENHYDRAMINE HCL 25 MG
50 CAPSULE ORAL ONCE AS NEEDED
Status: DISCONTINUED | OUTPATIENT
Start: 2021-12-07 | End: 2021-12-09 | Stop reason: HOSPADM

## 2021-12-07 RX ORDER — EPINEPHRINE 1 MG/ML
0.3 INJECTION, SOLUTION, CONCENTRATE INTRAVENOUS AS NEEDED
Status: CANCELLED | OUTPATIENT
Start: 2021-12-07

## 2021-12-07 RX ORDER — DIPHENHYDRAMINE HYDROCHLORIDE 50 MG/ML
50 INJECTION INTRAMUSCULAR; INTRAVENOUS ONCE AS NEEDED
Status: CANCELLED | OUTPATIENT
Start: 2021-12-07

## 2021-12-07 RX ORDER — DIPHENHYDRAMINE HCL 25 MG
50 CAPSULE ORAL ONCE AS NEEDED
Status: CANCELLED | OUTPATIENT
Start: 2021-12-07

## 2021-12-07 RX ORDER — SODIUM CHLORIDE 9 MG/ML
30 INJECTION, SOLUTION INTRAVENOUS ONCE
Status: COMPLETED | OUTPATIENT
Start: 2021-12-07 | End: 2021-12-07

## 2021-12-07 RX ORDER — METHYLPREDNISOLONE SODIUM SUCCINATE 125 MG/2ML
125 INJECTION, POWDER, LYOPHILIZED, FOR SOLUTION INTRAMUSCULAR; INTRAVENOUS AS NEEDED
Status: CANCELLED | OUTPATIENT
Start: 2021-12-07

## 2021-12-07 RX ORDER — METHYLPREDNISOLONE SODIUM SUCCINATE 125 MG/2ML
125 INJECTION, POWDER, LYOPHILIZED, FOR SOLUTION INTRAMUSCULAR; INTRAVENOUS AS NEEDED
Status: DISCONTINUED | OUTPATIENT
Start: 2021-12-07 | End: 2021-12-09 | Stop reason: HOSPADM

## 2021-12-07 RX ORDER — SODIUM CHLORIDE 9 MG/ML
30 INJECTION, SOLUTION INTRAVENOUS ONCE
Status: CANCELLED | OUTPATIENT
Start: 2021-12-07

## 2021-12-07 RX ORDER — DIPHENHYDRAMINE HYDROCHLORIDE 50 MG/ML
50 INJECTION INTRAMUSCULAR; INTRAVENOUS ONCE AS NEEDED
Status: DISCONTINUED | OUTPATIENT
Start: 2021-12-07 | End: 2021-12-09 | Stop reason: HOSPADM

## 2021-12-07 RX ADMIN — CASIRIVIMAB AND IMDEVIMAB: 600; 600 INJECTION, SOLUTION, CONCENTRATE INTRAVENOUS at 08:04

## 2021-12-07 RX ADMIN — SODIUM CHLORIDE 30 ML: 9 INJECTION, SOLUTION INTRAVENOUS at 08:26

## 2021-12-08 ENCOUNTER — HOSPITAL ENCOUNTER (INPATIENT)
Facility: HOSPITAL | Age: 71
LOS: 1 days | Discharge: HOME | End: 2021-12-11
Attending: EMERGENCY MEDICINE | Admitting: INTERNAL MEDICINE

## 2021-12-08 ENCOUNTER — APPOINTMENT (OUTPATIENT)
Dept: CT IMAGING | Facility: HOSPITAL | Age: 71
End: 2021-12-08

## 2021-12-08 ENCOUNTER — TELEPHONE (OUTPATIENT)
Dept: FAMILY MEDICINE CLINIC | Facility: CLINIC | Age: 71
End: 2021-12-08

## 2021-12-08 DIAGNOSIS — E87.6 HYPOKALEMIA: ICD-10-CM

## 2021-12-08 DIAGNOSIS — U07.1 PNEUMONIA DUE TO COVID-19 VIRUS: Primary | ICD-10-CM

## 2021-12-08 DIAGNOSIS — J12.82 PNEUMONIA DUE TO COVID-19 VIRUS: Primary | ICD-10-CM

## 2021-12-08 LAB
ALBUMIN SERPL-MCNC: 4.2 G/DL (ref 3.5–5.2)
ALBUMIN/GLOB SERPL: 1.3 G/DL
ALP SERPL-CCNC: 56 U/L (ref 39–117)
ALT SERPL W P-5'-P-CCNC: 47 U/L (ref 1–33)
ANION GAP SERPL CALCULATED.3IONS-SCNC: 15 MMOL/L (ref 5–15)
AST SERPL-CCNC: 48 U/L (ref 1–32)
BASOPHILS # BLD AUTO: 0 10*3/MM3 (ref 0–0.2)
BASOPHILS NFR BLD AUTO: 0.4 % (ref 0–1.5)
BILIRUB SERPL-MCNC: 0.6 MG/DL (ref 0–1.2)
BUN SERPL-MCNC: 17 MG/DL (ref 8–23)
BUN/CREAT SERPL: 17.7 (ref 7–25)
CALCIUM SPEC-SCNC: 9.4 MG/DL (ref 8.6–10.5)
CHLORIDE SERPL-SCNC: 94 MMOL/L (ref 98–107)
CO2 SERPL-SCNC: 28 MMOL/L (ref 22–29)
CREAT SERPL-MCNC: 0.96 MG/DL (ref 0.57–1)
DEPRECATED RDW RBC AUTO: 39.8 FL (ref 37–54)
EOSINOPHIL # BLD AUTO: 0 10*3/MM3 (ref 0–0.4)
EOSINOPHIL NFR BLD AUTO: 0 % (ref 0.3–6.2)
ERYTHROCYTE [DISTWIDTH] IN BLOOD BY AUTOMATED COUNT: 13.1 % (ref 12.3–15.4)
GFR SERPL CREATININE-BSD FRML MDRD: 57 ML/MIN/1.73
GLOBULIN UR ELPH-MCNC: 3.2 GM/DL
GLUCOSE SERPL-MCNC: 126 MG/DL (ref 65–99)
HCT VFR BLD AUTO: 41.6 % (ref 34–46.6)
HGB BLD-MCNC: 14.3 G/DL (ref 12–15.9)
LYMPHOCYTES # BLD AUTO: 0.8 10*3/MM3 (ref 0.7–3.1)
LYMPHOCYTES NFR BLD AUTO: 10.9 % (ref 19.6–45.3)
MCH RBC QN AUTO: 30.4 PG (ref 26.6–33)
MCHC RBC AUTO-ENTMCNC: 34.5 G/DL (ref 31.5–35.7)
MCV RBC AUTO: 88.2 FL (ref 79–97)
MONOCYTES # BLD AUTO: 0.8 10*3/MM3 (ref 0.1–0.9)
MONOCYTES NFR BLD AUTO: 11.4 % (ref 5–12)
NEUTROPHILS NFR BLD AUTO: 5.5 10*3/MM3 (ref 1.7–7)
NEUTROPHILS NFR BLD AUTO: 77.3 % (ref 42.7–76)
NRBC BLD AUTO-RTO: 0.1 /100 WBC (ref 0–0.2)
PLATELET # BLD AUTO: 241 10*3/MM3 (ref 140–450)
PMV BLD AUTO: 7.5 FL (ref 6–12)
POTASSIUM SERPL-SCNC: 3 MMOL/L (ref 3.5–5.2)
PROCALCITONIN SERPL-MCNC: 0.05 NG/ML (ref 0–0.25)
PROT SERPL-MCNC: 7.4 G/DL (ref 6–8.5)
RBC # BLD AUTO: 4.71 10*6/MM3 (ref 3.77–5.28)
SODIUM SERPL-SCNC: 137 MMOL/L (ref 136–145)
TROPONIN T SERPL-MCNC: <0.01 NG/ML (ref 0–0.03)
WBC NRBC COR # BLD: 7.1 10*3/MM3 (ref 3.4–10.8)

## 2021-12-08 PROCEDURE — 85025 COMPLETE CBC W/AUTO DIFF WBC: CPT | Performed by: EMERGENCY MEDICINE

## 2021-12-08 PROCEDURE — G0378 HOSPITAL OBSERVATION PER HR: HCPCS

## 2021-12-08 PROCEDURE — 94664 DEMO&/EVAL PT USE INHALER: CPT

## 2021-12-08 PROCEDURE — 94640 AIRWAY INHALATION TREATMENT: CPT

## 2021-12-08 PROCEDURE — 99284 EMERGENCY DEPT VISIT MOD MDM: CPT

## 2021-12-08 PROCEDURE — 80053 COMPREHEN METABOLIC PANEL: CPT | Performed by: EMERGENCY MEDICINE

## 2021-12-08 PROCEDURE — 94799 UNLISTED PULMONARY SVC/PX: CPT

## 2021-12-08 PROCEDURE — 87040 BLOOD CULTURE FOR BACTERIA: CPT | Performed by: EMERGENCY MEDICINE

## 2021-12-08 PROCEDURE — 25010000002 DEXAMETHASONE PER 1 MG: Performed by: EMERGENCY MEDICINE

## 2021-12-08 PROCEDURE — 71275 CT ANGIOGRAPHY CHEST: CPT

## 2021-12-08 PROCEDURE — 84145 PROCALCITONIN (PCT): CPT | Performed by: EMERGENCY MEDICINE

## 2021-12-08 PROCEDURE — 84484 ASSAY OF TROPONIN QUANT: CPT | Performed by: EMERGENCY MEDICINE

## 2021-12-08 PROCEDURE — 0 IOPAMIDOL PER 1 ML: Performed by: EMERGENCY MEDICINE

## 2021-12-08 RX ORDER — ALBUTEROL SULFATE 90 UG/1
2 AEROSOL, METERED RESPIRATORY (INHALATION) ONCE
Status: COMPLETED | OUTPATIENT
Start: 2021-12-08 | End: 2021-12-08

## 2021-12-08 RX ORDER — CHOLECALCIFEROL (VITAMIN D3) 125 MCG
5 CAPSULE ORAL NIGHTLY PRN
Status: DISCONTINUED | OUTPATIENT
Start: 2021-12-08 | End: 2021-12-11 | Stop reason: HOSPADM

## 2021-12-08 RX ORDER — POTASSIUM CHLORIDE 20 MEQ/1
20 TABLET, EXTENDED RELEASE ORAL
Status: DISCONTINUED | OUTPATIENT
Start: 2021-12-09 | End: 2021-12-11 | Stop reason: HOSPADM

## 2021-12-08 RX ORDER — ACETAMINOPHEN 325 MG/1
650 TABLET ORAL EVERY 4 HOURS PRN
Status: DISCONTINUED | OUTPATIENT
Start: 2021-12-08 | End: 2021-12-11 | Stop reason: HOSPADM

## 2021-12-08 RX ORDER — ONDANSETRON 2 MG/ML
4 INJECTION INTRAMUSCULAR; INTRAVENOUS EVERY 6 HOURS PRN
Status: DISCONTINUED | OUTPATIENT
Start: 2021-12-08 | End: 2021-12-11 | Stop reason: HOSPADM

## 2021-12-08 RX ORDER — DEXAMETHASONE SODIUM PHOSPHATE 4 MG/ML
4 INJECTION, SOLUTION INTRA-ARTICULAR; INTRALESIONAL; INTRAMUSCULAR; INTRAVENOUS; SOFT TISSUE ONCE
Status: COMPLETED | OUTPATIENT
Start: 2021-12-08 | End: 2021-12-08

## 2021-12-08 RX ORDER — SODIUM CHLORIDE 0.9 % (FLUSH) 0.9 %
10 SYRINGE (ML) INJECTION EVERY 12 HOURS SCHEDULED
Status: DISCONTINUED | OUTPATIENT
Start: 2021-12-09 | End: 2021-12-11 | Stop reason: HOSPADM

## 2021-12-08 RX ORDER — SODIUM CHLORIDE 0.9 % (FLUSH) 0.9 %
10 SYRINGE (ML) INJECTION AS NEEDED
Status: DISCONTINUED | OUTPATIENT
Start: 2021-12-08 | End: 2021-12-11 | Stop reason: HOSPADM

## 2021-12-08 RX ORDER — DEXAMETHASONE SODIUM PHOSPHATE 4 MG/ML
2 INJECTION, SOLUTION INTRA-ARTICULAR; INTRALESIONAL; INTRAMUSCULAR; INTRAVENOUS; SOFT TISSUE EVERY 8 HOURS
Status: DISCONTINUED | OUTPATIENT
Start: 2021-12-09 | End: 2021-12-11 | Stop reason: HOSPADM

## 2021-12-08 RX ORDER — ALBUTEROL SULFATE 90 UG/1
2 AEROSOL, METERED RESPIRATORY (INHALATION)
Status: DISCONTINUED | OUTPATIENT
Start: 2021-12-09 | End: 2021-12-11 | Stop reason: HOSPADM

## 2021-12-08 RX ORDER — POTASSIUM CHLORIDE 20 MEQ/1
40 TABLET, EXTENDED RELEASE ORAL ONCE
Status: COMPLETED | OUTPATIENT
Start: 2021-12-08 | End: 2021-12-08

## 2021-12-08 RX ADMIN — ALBUTEROL SULFATE 2 PUFF: 108 AEROSOL, METERED RESPIRATORY (INHALATION) at 17:09

## 2021-12-08 RX ADMIN — POTASSIUM CHLORIDE 40 MEQ: 1500 TABLET, EXTENDED RELEASE ORAL at 22:57

## 2021-12-08 RX ADMIN — IOPAMIDOL 100 ML: 755 INJECTION, SOLUTION INTRAVENOUS at 19:47

## 2021-12-08 RX ADMIN — DEXAMETHASONE SODIUM PHOSPHATE 4 MG: 4 INJECTION, SOLUTION INTRAMUSCULAR; INTRAVENOUS at 17:17

## 2021-12-08 RX ADMIN — SODIUM CHLORIDE 500 ML: 9 INJECTION, SOLUTION INTRAVENOUS at 17:18

## 2021-12-08 NOTE — TELEPHONE ENCOUNTER
Caller: Jess Mackay    Relationship: Self    Best call back number: [phone]     What is the best time to reach you: ANYTIME     Who are you requesting to speak with (clinical staff, provider,  specific staff member):     Do you know the name of the person who called:     What was the call regarding: PATIENT CALLING WANTING TO KNOW IF SHE SHOULD CONTINUE TAKING THE ANTIBIOTIC AND PREDNISONE SINCE SHE RECEIVED ANTIBODY INFUSION SHE STATED SHE IS STILL FEELING FATIGUES, COUGH     Do you require a callback: YES

## 2021-12-08 NOTE — ED NOTES
Pt says her and her  were dx with COVID last week, came in for antibody infusion yesterday and pt feels like she has not gotten better since then. Pt says she has been coughing a lot, has no appetite, weakness, thinks her O2 sat has dropped from 95% to 93%. Pt says she takes 10 mEq Potassium supplement and has not been taking that for 2-3 days.      Gerry Carpenter RN  12/08/21 [id number]

## 2021-12-09 LAB
ANION GAP SERPL CALCULATED.3IONS-SCNC: 13 MMOL/L (ref 5–15)
BASOPHILS # BLD AUTO: 0 10*3/MM3 (ref 0–0.2)
BASOPHILS NFR BLD AUTO: 0.2 % (ref 0–1.5)
BUN SERPL-MCNC: 19 MG/DL (ref 8–23)
BUN/CREAT SERPL: 21.1 (ref 7–25)
CALCIUM SPEC-SCNC: 8.9 MG/DL (ref 8.6–10.5)
CHLORIDE SERPL-SCNC: 103 MMOL/L (ref 98–107)
CO2 SERPL-SCNC: 27 MMOL/L (ref 22–29)
CREAT SERPL-MCNC: 0.9 MG/DL (ref 0.57–1)
DEPRECATED RDW RBC AUTO: 40.3 FL (ref 37–54)
EOSINOPHIL # BLD AUTO: 0 10*3/MM3 (ref 0–0.4)
EOSINOPHIL NFR BLD AUTO: 0 % (ref 0.3–6.2)
ERYTHROCYTE [DISTWIDTH] IN BLOOD BY AUTOMATED COUNT: 12.9 % (ref 12.3–15.4)
GFR SERPL CREATININE-BSD FRML MDRD: 62 ML/MIN/1.73
GLUCOSE SERPL-MCNC: 141 MG/DL (ref 65–99)
HCT VFR BLD AUTO: 40.5 % (ref 34–46.6)
HGB BLD-MCNC: 13.9 G/DL (ref 12–15.9)
LYMPHOCYTES # BLD AUTO: 1.1 10*3/MM3 (ref 0.7–3.1)
LYMPHOCYTES NFR BLD AUTO: 20.2 % (ref 19.6–45.3)
MAGNESIUM SERPL-MCNC: 1.8 MG/DL (ref 1.6–2.4)
MCH RBC QN AUTO: 30.7 PG (ref 26.6–33)
MCHC RBC AUTO-ENTMCNC: 34.4 G/DL (ref 31.5–35.7)
MCV RBC AUTO: 89.2 FL (ref 79–97)
MONOCYTES # BLD AUTO: 0.8 10*3/MM3 (ref 0.1–0.9)
MONOCYTES NFR BLD AUTO: 13.9 % (ref 5–12)
NEUTROPHILS NFR BLD AUTO: 3.6 10*3/MM3 (ref 1.7–7)
NEUTROPHILS NFR BLD AUTO: 65.7 % (ref 42.7–76)
NRBC BLD AUTO-RTO: 0.2 /100 WBC (ref 0–0.2)
PLATELET # BLD AUTO: 261 10*3/MM3 (ref 140–450)
PMV BLD AUTO: 7.5 FL (ref 6–12)
POTASSIUM SERPL-SCNC: 2.9 MMOL/L (ref 3.5–5.2)
POTASSIUM SERPL-SCNC: 3.2 MMOL/L (ref 3.5–5.2)
RBC # BLD AUTO: 4.54 10*6/MM3 (ref 3.77–5.28)
SODIUM SERPL-SCNC: 143 MMOL/L (ref 136–145)
TROPONIN T SERPL-MCNC: <0.01 NG/ML (ref 0–0.03)
TSH SERPL DL<=0.05 MIU/L-ACNC: 0.3 UIU/ML (ref 0.27–4.2)
WBC NRBC COR # BLD: 5.5 10*3/MM3 (ref 3.4–10.8)

## 2021-12-09 PROCEDURE — 80048 BASIC METABOLIC PNL TOTAL CA: CPT | Performed by: EMERGENCY MEDICINE

## 2021-12-09 PROCEDURE — 83735 ASSAY OF MAGNESIUM: CPT | Performed by: EMERGENCY MEDICINE

## 2021-12-09 PROCEDURE — 85025 COMPLETE CBC W/AUTO DIFF WBC: CPT | Performed by: EMERGENCY MEDICINE

## 2021-12-09 PROCEDURE — 84132 ASSAY OF SERUM POTASSIUM: CPT | Performed by: PHYSICIAN ASSISTANT

## 2021-12-09 PROCEDURE — 84484 ASSAY OF TROPONIN QUANT: CPT | Performed by: EMERGENCY MEDICINE

## 2021-12-09 PROCEDURE — 94618 PULMONARY STRESS TESTING: CPT

## 2021-12-09 PROCEDURE — G0378 HOSPITAL OBSERVATION PER HR: HCPCS

## 2021-12-09 PROCEDURE — 25010000002 DEXAMETHASONE PER 1 MG: Performed by: EMERGENCY MEDICINE

## 2021-12-09 PROCEDURE — 36415 COLL VENOUS BLD VENIPUNCTURE: CPT | Performed by: EMERGENCY MEDICINE

## 2021-12-09 PROCEDURE — 84443 ASSAY THYROID STIM HORMONE: CPT | Performed by: PHYSICIAN ASSISTANT

## 2021-12-09 PROCEDURE — 94799 UNLISTED PULMONARY SVC/PX: CPT

## 2021-12-09 RX ORDER — BENZONATATE 100 MG/1
100 CAPSULE ORAL 3 TIMES DAILY PRN
Status: DISCONTINUED | OUTPATIENT
Start: 2021-12-09 | End: 2021-12-11 | Stop reason: HOSPADM

## 2021-12-09 RX ORDER — POTASSIUM CHLORIDE 1.5 G/1.77G
40 POWDER, FOR SOLUTION ORAL AS NEEDED
Status: DISCONTINUED | OUTPATIENT
Start: 2021-12-09 | End: 2021-12-11 | Stop reason: HOSPADM

## 2021-12-09 RX ORDER — POTASSIUM CHLORIDE 20 MEQ/1
40 TABLET, EXTENDED RELEASE ORAL AS NEEDED
Status: DISCONTINUED | OUTPATIENT
Start: 2021-12-09 | End: 2021-12-11 | Stop reason: HOSPADM

## 2021-12-09 RX ORDER — ASPIRIN 325 MG
325 TABLET ORAL DAILY
Status: DISCONTINUED | OUTPATIENT
Start: 2021-12-09 | End: 2021-12-11 | Stop reason: HOSPADM

## 2021-12-09 RX ORDER — HYDROCHLOROTHIAZIDE 25 MG/1
25 TABLET ORAL DAILY
Status: DISCONTINUED | OUTPATIENT
Start: 2021-12-09 | End: 2021-12-11 | Stop reason: HOSPADM

## 2021-12-09 RX ORDER — POTASSIUM CHLORIDE 7.45 MG/ML
10 INJECTION INTRAVENOUS
Status: DISCONTINUED | OUTPATIENT
Start: 2021-12-09 | End: 2021-12-11 | Stop reason: HOSPADM

## 2021-12-09 RX ORDER — AMLODIPINE BESYLATE 5 MG/1
10 TABLET ORAL DAILY
Status: DISCONTINUED | OUTPATIENT
Start: 2021-12-09 | End: 2021-12-11 | Stop reason: HOSPADM

## 2021-12-09 RX ADMIN — POTASSIUM CHLORIDE 40 MEQ: 1500 TABLET, EXTENDED RELEASE ORAL at 22:20

## 2021-12-09 RX ADMIN — ASPIRIN 325 MG ORAL TABLET 325 MG: 325 PILL ORAL at 09:40

## 2021-12-09 RX ADMIN — ALBUTEROL SULFATE 2 PUFF: 90 AEROSOL, METERED RESPIRATORY (INHALATION) at 01:19

## 2021-12-09 RX ADMIN — BENZONATATE 100 MG: 100 CAPSULE ORAL at 09:40

## 2021-12-09 RX ADMIN — SODIUM CHLORIDE, PRESERVATIVE FREE 10 ML: 5 INJECTION INTRAVENOUS at 22:20

## 2021-12-09 RX ADMIN — SODIUM CHLORIDE, PRESERVATIVE FREE 10 ML: 5 INJECTION INTRAVENOUS at 18:25

## 2021-12-09 RX ADMIN — BENZONATATE 100 MG: 100 CAPSULE ORAL at 22:20

## 2021-12-09 RX ADMIN — AMLODIPINE BESYLATE 10 MG: 5 TABLET ORAL at 09:40

## 2021-12-09 RX ADMIN — ALBUTEROL SULFATE 2 PUFF: 90 AEROSOL, METERED RESPIRATORY (INHALATION) at 07:32

## 2021-12-09 RX ADMIN — POTASSIUM CHLORIDE 20 MEQ: 1500 TABLET, EXTENDED RELEASE ORAL at 09:40

## 2021-12-09 RX ADMIN — DEXAMETHASONE SODIUM PHOSPHATE 2 MG: 4 INJECTION, SOLUTION INTRAMUSCULAR; INTRAVENOUS at 18:25

## 2021-12-09 RX ADMIN — ALBUTEROL SULFATE 2 PUFF: 90 AEROSOL, METERED RESPIRATORY (INHALATION) at 11:40

## 2021-12-09 RX ADMIN — SODIUM CHLORIDE, PRESERVATIVE FREE 10 ML: 5 INJECTION INTRAVENOUS at 00:18

## 2021-12-09 RX ADMIN — DEXAMETHASONE SODIUM PHOSPHATE 2 MG: 4 INJECTION, SOLUTION INTRAMUSCULAR; INTRAVENOUS at 09:40

## 2021-12-09 RX ADMIN — SODIUM CHLORIDE, PRESERVATIVE FREE 10 ML: 5 INJECTION INTRAVENOUS at 09:40

## 2021-12-09 RX ADMIN — HYDROCHLOROTHIAZIDE 25 MG: 25 TABLET ORAL at 09:40

## 2021-12-09 RX ADMIN — POTASSIUM CHLORIDE 40 MEQ: 1500 TABLET, EXTENDED RELEASE ORAL at 18:25

## 2021-12-09 RX ADMIN — DEXAMETHASONE SODIUM PHOSPHATE 2 MG: 4 INJECTION, SOLUTION INTRAMUSCULAR; INTRAVENOUS at 00:18

## 2021-12-09 RX ADMIN — ALBUTEROL SULFATE 2 PUFF: 90 AEROSOL, METERED RESPIRATORY (INHALATION) at 21:20

## 2021-12-09 RX ADMIN — POTASSIUM CHLORIDE 40 MEQ: 1500 TABLET, EXTENDED RELEASE ORAL at 14:31

## 2021-12-09 NOTE — CASE MANAGEMENT/SOCIAL WORK
Discharge Planning Assessment   Jun     Patient Name: Jess Mackay  MRN: [id number]  Today's Date: 12/9/2021    Admit Date: 12/8/2021     Discharge Needs Assessment     Row Name 12/09/21 1016       Living Environment    Lives With spouse    Current Living Arrangements home/apartment/condo    Quality of Family Relationships supportive    Able to Return to Prior Arrangements yes    Living Arrangement Comments pt's spouse has covid-19 also       Resource/Environmental Concerns    Resource/Environmental Concerns none    Transportation Concerns car, none       Transition Planning    Patient/Family Anticipates Transition to home with family    Patient/Family Anticipated Services at Transition none    Transportation Anticipated family or friend will provide       Discharge Needs Assessment    Readmission Within the Last 30 Days no previous admission in last 30 days    Equipment Currently Used at Home oxygen               Discharge Plan     Row Name 12/09/21 1017       Plan    Plan D/C Plan : Aniticpate routine to home , pt did qualify for home o2 , Linda referral made for home o2    Plan Comments Confirmed PCP and pharmacy , family for transport home , pt did state her  also has covid , pt k 2.9 and will replace today              Continued Care and Services - Admitted Since 12/8/2021     Durable Medical Equipment     Service Provider Request Status Selected Services Address Phone Fax Patient Preferred    [facility]  Pending - No Request Sent N/A [address] [address] [phone] --                 Demographic Summary     Row Name 12/09/21 1016       General Information    Admission Type observation    Arrived From emergency department    Required Notices Provided Observation Status Notice    Preferred Language English     Used During This Interaction no               Functional Status     Row Name 12/09/21 1016       Functional Status     Usual Activity Tolerance good    Current Activity Tolerance good       Mental Status    General Appearance WDL WDL       Mental Status Summary    Recent Changes in Mental Status/Cognitive Functioning no changes              Phone communication or documentation only - no physical contact with patient or family.           Alma Avila RN

## 2021-12-09 NOTE — DISCHARGE PLACEMENT REQUEST
"Zana Mackay (71 y.o. Female)             Date of Birth Social Security Number Address Home Phone MRN    1950  [address]  [place] [address] [phone] [id number]    [employer] Marital Status             None        Admission Date Admission Type Admitting Provider Attending Provider Department, Room/Bed    12/8/21 Emergency Duran Woods MD Harris, Thomas M, MD [facility] OBSERVATION, 101/1    Discharge Date Discharge Disposition Discharge Destination                         Attending Provider: Duran Woods MD    Allergies: Cinnamon, Cortisone, Cucumber Extract, Erythromycin, Penicillins, Tea    Isolation: Enh Drop/Con   Infection: COVID (confirmed) (12/01/21)   Code Status: CPR   Advance Care Planning Activity    Ht: 160 cm (63\")   Wt: 74.4 kg (164 lb 0.4 oz)    Admission Cmt: None   Principal Problem: None                Active Insurance as of 12/8/2021     Primary Coverage     Payor Plan Insurance Group Employer/Plan Group    MEDICARE MEDICARE A & B      Payor Plan Address Payor Plan Phone Number Payor Plan Fax Number Effective Dates    [address] [phone]  1/1/2015 - None Entered    [facility] [address]       Subscriber Name Subscriber Birth Date Member ID       ZANA MACKAY 1950 [id number]           Secondary Coverage     Payor Plan Insurance Group Employer/Plan Group    AETNA COMMERCIAL AETNA HEALTH AND LIFE  SUP        PLAN G     Payor Plan Address Payor Plan Phone Number Payor Plan Fax Number Effective Dates    [address] [address]   2/1/2020 - None Entered    [facility] [phone]       Subscriber Name Subscriber Birth Date Member ID       ZANA MACKAY 1950 [id number]                 Emergency Contacts      (Rel.) Home Phone Work Phone Mobile Phone    JESSI MACKAY (Spouse) [phone] -- [phone]              "

## 2021-12-09 NOTE — PROGRESS NOTES
Exercise Oximetry    Patient Name:Jess Mackay   MRN: [id number]   Date: 12/09/21             ROOM AIR BASELINE   SpO2% 88   Heart Rate 62   Blood Pressure      EXERCISE ON ROOM AIR SpO2% EXERCISE ON O2 @  LPM SpO2%   1 MINUTE  1 MINUTE    2 MINUTES  2 MINUTES    3 MINUTES  3 MINUTES    4 MINUTES  4 MINUTES    5 MINUTES  5 MINUTES    6 MINUTES  6 MINUTES               Distance Walked  N/A Distance Walked   Dyspnea (Lesa Scale)   Dyspnea (Lesa Scale)   Fatigue (Lesa Scale)   Fatigue (Lesa Scale)   SpO2% Post Exercise  93 SpO2% Post Exercise   HR Post Exercise  69 HR Post Exercise   Time to Recovery   Time to Recovery     Comments: Pt resting RA SpO2 was 88%. Placed pt on 1L n/c. SpO2 chandan to 93%. 1 LPM N/C recommended. Nae Howard, RRT

## 2021-12-09 NOTE — DISCHARGE PLACEMENT REQUEST
"Zana Mackay (71 y.o. Female)             Date of Birth Social Security Number Address Home Phone MRN    1950  [address]  [place] [address] [phone] [id number]    [employer] Marital Status             None        Admission Date Admission Type Admitting Provider Attending Provider Department, Room/Bed    12/8/21 Emergency Duran Woods MD Harris, Thomas M, MD [facility] OBSERVATION, 101/1    Discharge Date Discharge Disposition Discharge Destination                         Attending Provider: Duran Woods MD    Allergies: Cinnamon, Cortisone, Cucumber Extract, Erythromycin, Penicillins, Tea    Isolation: Enh Drop/Con   Infection: COVID (confirmed) (12/01/21)   Code Status: CPR   Advance Care Planning Activity    Ht: 160 cm (63\")   Wt: 74.4 kg (164 lb 0.4 oz)    Admission Cmt: None   Principal Problem: None                Active Insurance as of 12/8/2021     Primary Coverage     Payor Plan Insurance Group Employer/Plan Group    MEDICARE MEDICARE A & B      Payor Plan Address Payor Plan Phone Number Payor Plan Fax Number Effective Dates    [address] [phone]  1/1/2015 - None Entered    [facility] [address]       Subscriber Name Subscriber Birth Date Member ID       ZANA MACKAY 1950 [id number]           Secondary Coverage     Payor Plan Insurance Group Employer/Plan Group    AETNA COMMERCIAL AETNA HEALTH AND LIFE  SUP        PLAN G     Payor Plan Address Payor Plan Phone Number Payor Plan Fax Number Effective Dates    [address] [address]   2/1/2020 - None Entered    [facility] [address]       Subscriber Name Subscriber Birth Date Member ID       ZANA MACKAY 1950 [id number]                 Emergency Contacts      (Rel.) Home Phone Work Phone Mobile Phone    JESSI MACKAY (Spouse) [phone] -- [phone]            "

## 2021-12-09 NOTE — H&P
[facility]    Patient Name: Jess Mackay  : 1950  MRN: [id number]  Primary Care Physician: Ivette Schaefer MD  Date of admission: 2021     Patient Care Team:  Ivette Schaefer MD as PCP - General  Pelon Montiel DC (Chiropractic Medicine)          Subjective   History Present Illness     Chief Complaint:   Chief Complaint   Patient presents with   • Exposure To Known Illness     covid + last wednesday states she had antibody infusion yesterday and isn't any better     Obtained from admitting provider HPI on 2021:  71-year-old female on day 8 of febrile illness confirmed to be COVID-19.  The patient had a infusion of the Regeneron yesterday morning.  She states that she has not had no fever chills since then was had increasing shortness of breath.  The patient states that she has felt somewhat weak and achy today.  She states that she quit taking her potassium replacement several days ago because she felt as if it was making her nauseated.  She states that she has been following her oxygen level and has been in the 90 to 91% range for most of the day.  She reports no leg pain or swelling.  She denies night sweats or hemoptysis.  She reports that she has not had wheezing.  She reports that she has not had muscle cramps but has had muscle and joint achiness for over a week now     2021: Patient emergency HPI noted above including approximately 8-day history post Covid diagnosis with recent loss of taste and smell.  She notes some nausea and recently received monoclonal antibody infusion following Covid diagnosis.  Over the past several days she reports some increase in fatigue as well as a continued nonproductive cough and some dyspnea patient further notes that she has multiple medication allergies and has had adverse reaction to several medications and has been trying to space her medications farther apart however she has forgotten to take her potassium supplement  for the past several days.      ROS   Review of Systems   Constitutional: Positive for chills, decreased appetite, fever and malaise/fatigue.   HENT: Negative.    Cardiovascular: Negative.    Respiratory: Positive for cough and shortness of breath. Negative for sputum production.    Endocrine: Negative.    Skin: Negative.    Musculoskeletal: Negative.    Gastrointestinal: Negative.    Genitourinary: Negative.    Neurological: Positive for sensory change and weakness.        Recent loss of sense of taste and smell reported   Psychiatric/Behavioral: Negative.          Personal History     Past Medical History:   Past Medical History:   Diagnosis Date   • Allergic    • Allergic rhinitis due to pollen    • Arthritis    • Benign hypertension    • Female stress incontinence    • Gastroesophageal reflux disease without esophagitis    • History of tobacco use    • Injury of back    • Injury of neck    • Leg length discrepancy    • Menopausal syndrome    • Scoliosis, congenital    • Urge urinary incontinence    • Varus deformity of great toe, left        Surgical History:      Past Surgical History:   Procedure Laterality Date   • BREAST BIOPSY Left    • BUNIONECTOMY     •  SECTION      x2   • TUBAL ABDOMINAL LIGATION             Family History: family history includes Heart disease in her mother; Hypertension in her brother; Stroke in her mother. Otherwise pertinent FHx was reviewed and unremarkable.     Social History:  reports that she quit smoking about 12 years ago. Her smoking use included cigarettes. She has a 17.50 pack-year smoking history. She has never used smokeless tobacco. She reports current alcohol use of about 3.0 - 5.0 standard drinks of alcohol per week. She reports that she does not use drugs.      Medications:  Prior to Admission medications    Medication Sig Start Date End Date Taking? Authorizing Provider   amLODIPine (NORVASC) 10 MG tablet Take 1 tablet by mouth Daily. 20   Yes Ivette Schaefer MD   aspirin 325 MG tablet Take 325 mg by mouth Daily.   Yes ProviderNettie MD   benzonatate (TESSALON) 100 MG capsule Take 1 capsule by mouth 3 (Three) Times a Day As Needed for Cough. 12/3/21  Yes Ivette Schaefer MD   doxycycline (MONODOX) 100 MG capsule Take 1 capsule by mouth 2 (Two) Times a Day. 11/30/21  Yes Ivette Schaefer MD   fexofenadine (ALLEGRA) 180 MG tablet Take 180 mg by mouth Daily.   Yes ProviderNettie MD   hydroCHLOROthiazide (HYDRODIURIL) 25 MG tablet Take 1 tablet by mouth Daily. 11/30/20  Yes Ivette Schaefer MD   predniSONE (DELTASONE) 10 MG tablet Take 1 tablet by mouth Take As Directed for 15 days. 3 tab x 3 d, 2 tab x 3 d, 1 tab x 3 days and stop 11/30/21 12/15/21 Yes Ivette Schaefer MD   alendronate (FOSAMAX) 70 MG tablet Take 1 tablet by mouth Every 7 (Seven) Days. Take with 8 ounces of water 30 minutes before first food, drink, or medication. Avoid lying down for 30 minutes 2/3/21   Ivette Schaefer MD   potassium chloride 10 MEQ CR tablet Take 1 tablet by mouth Daily. 11/30/20   Ivette Schaefer MD       Allergies:    Allergies   Allergen Reactions   • Cinnamon Unknown - Low Severity   • Cortisone Itching   • Cucumber Extract Unknown - Low Severity   • Erythromycin Itching     hyper   • Penicillins Unknown - Low Severity     convulsions   • Tea Unknown - Low Severity       Objective   Objective     Vital Signs  Temp:  [97.5 °F (36.4 °C)-98.3 °F (36.8 °C)] 98.3 °F (36.8 °C)  Heart Rate:  [55-72] 68  Resp:  [16-22] 18  BP: (112-152)/(57-78) 112/64  SpO2:  [88 %-97 %] 91 %  on  Flow (L/min):  [1-3] 1;   Device (Oxygen Therapy): nasal cannula; humidified  Body mass index is 29.06 kg/m².    Physical Exam  Review of Systems   Constitutional: Positive for chills, decreased appetite, fever and malaise/fatigue.   HENT: Negative.    Cardiovascular: Negative.    Respiratory: Positive for cough and shortness of breath. Negative for sputum production.     Endocrine: Negative.    Skin: Negative.    Musculoskeletal: Negative.    Gastrointestinal: Negative.    Genitourinary: Negative.    Neurological: Positive for sensory change and weakness.        Recent loss of sense of taste and smell reported   Psychiatric/Behavioral: Negative.      Results Review:  I have personally reviewed most recent lab results, microbiology results and radiology images and interpretations and agree with findings, most notably: CBC, BMP, CT PE protocol, TSH, magnesium and previous Covid19 test.    Results from last 7 days   Lab Units 12/09/21  0751   WBC 10*3/mm3 5.50   HEMOGLOBIN g/dL 13.9   HEMATOCRIT % 40.5   PLATELETS 10*3/mm3 261     Results from last 7 days   Lab Units 12/09/21  0751 12/08/21  1711 12/08/21  1711   SODIUM mmol/L 143   < > 137   POTASSIUM mmol/L 2.9*   < > 3.0*   CHLORIDE mmol/L 103   < > 94*   CO2 mmol/L 27.0   < > 28.0   BUN mg/dL 19   < > 17   CREATININE mg/dL 0.90   < > 0.96   GLUCOSE mg/dL 141*   < > 126*   CALCIUM mg/dL 8.9   < > 9.4   ALT (SGPT) U/L  --   --  47*   AST (SGOT) U/L  --   --  48*   TROPONIN T ng/mL <0.010  --  <0.010   PROCALCITONIN ng/mL  --   --  0.05    < > = values in this interval not displayed.     Estimated Creatinine Clearance: 55.4 mL/min (by C-G formula based on SCr of 0.9 mg/dL).  Brief Urine Lab Results     None          Microbiology Results (last 10 days)     Procedure Component Value - Date/Time    COVID-19,[facility] ROUTINE, NP/OP SWAB IN TRANSPORT MEDIA OR ESWAB 72 HR TAT - Swab, Anterior nasal [[id number]]  (Abnormal) Collected: 11/30/21 1455    Lab Status: Final result Specimen: Swab from Anterior nasal Updated: 12/01/21 1436     SARS-CoV-2, JACKIE Detected     Comment: Patients who have a positive COVID-19 test result may now have  treatment options. Treatment options are available for patients  with mild to moderate symptoms and for hospitalized patients.  Visit our website at [email or web address] for  resources and  information.  This nucleic acid amplification test was developed and its performance  characteristics determined by [employer]. Nucleic acid  amplification tests include RT-PCR and TMA. This test has not been  FDA cleared or approved. This test has been authorized by FDA under  an Emergency Use Authorization (EUA). This test is only authorized  for the duration of time the declaration that circumstances exist  justifying the authorization of the emergency use of in vitro  diagnostic tests for detection of SARS-CoV-2 virus and/or diagnosis  of COVID-19 infection under section 564(b)(1) of the Act, 21 U.S.C.  360bbb-3(b) (1), unless the authorization is terminated or revoked  sooner.  When diagnostic testing is negative, the possibility of a false  negative result should be considered in the context of a patient's  recent exposures and the presence of clinical signs and symptoms  consistent with COVID-19. An individual without symptoms of COVID-19  and who is not shedding SARS-CoV-2 virus would expect to have a  negative (not detected) result in this assay.         Narrative:      Performed at:   - [address]  [id number]  : Daquan Miramontes PhD, Phone:  [phone]    SARS-CoV-2, JACKIE 2 DAY TAT - , [[id number]] Collected: 11/30/21 1455    Lab Status: Final result Updated: 12/01/21 1436     [facility] SARS-COV-2, JACKIE 2 DAY TAT Performed    Narrative:      Performed at:  [address] [place] [address]  [id number]  : Daquan Miramontes PhD, Phone:  [phone]          ECG/EMG Results (most recent)     None                  CT Chest Pulmonary Embolism    Result Date: 12/8/2021  1.No definite evidence for pulmonary embolus. 2.There are groundglass changes within the lungs which have developed and could reflect sequela to Covid 19 3.There are pulmonary nodules within the upper lobes which have been noted and will need to be followed. 4.There is  some fullness to the pancreatic head. This may just reflect volume averaging affects. A nonemergent MRI of the abdomen would be recommended to reassess. 5.Nodule right lobe thyroid. Ultrasound recommended to reassess this area. 6.Scoliosis with multilevel degenerative changes.  Electronically Signed By-Jarocho Torres MD On:12/8/2021 8:13 PM This report was finalized on [id number] by  Jarocho Torres MD.        Estimated Creatinine Clearance: 55.4 mL/min (by C-G formula based on SCr of 0.9 mg/dL).    Assessment/Plan   Assessment/Plan       Active Hospital Problems    Diagnosis  POA   • Pneumonia due to COVID-19 virus [U07.1, J12.82]  Yes      Resolved Hospital Problems   No resolved problems to display.     Fatigue (likely multifactorial related to Covid19 pneumonia and hypokalemia)  -Patient positive for COVID-19 on 11/30/2021  -WBCs: 7.90 with normal neutrophil and lymphocyte count noted on differential, platelets: 241  -CT PE protocol shows no definite evidence of pulmonary embolism with groundglass changes within the lungs which could reflect sequela of COVID-19 infection with pulmonary nodules in the upper lobes noted previously along with some fullness of the pancreatic head, nodules in the right lobe of the thyroid and scoliosis  -Potassium initially 3.0 and trended down to 2.9 following replacement in the ED, further replacement protocol ordered  -Magnesium: 1.8  -Check TSH  -Continue steroids, ProAir, Tessalon and Mucinex  -Maintain isolation precautions  -Had hoped for discharge today however patient reports she continues to have significant fatigue as well as dyspnea especially with exertion.  We will continue breathing treatments as well as potassium replacement and monitoring with hopeful discharge tomorrow.  Patient may need home O2 at discharge     Hypertension  -Well controlled with a most recent blood pressure of 127/62  - Continue amlodipine and hydrochlorothiazide  - Monitor while  admitted     Osteoporosis  -Encourage compliance with outpatient Fosamax therapy            VTE Prophylaxis -   Mechanical Order History:      Ordered        12/08/21 2330  Place Sequential Compression Device  Once            12/08/21 2330  Maintain Sequential Compression Device  Continuous                    Pharmalogical Order History:     None          CODE STATUS:    Code Status and Medical Interventions:   Ordered at: 12/08/21 2210     Level Of Support Discussed With:    Patient     Code Status (Patient has no pulse and is not breathing):    CPR (Attempt to Resuscitate)     Medical Interventions (Patient has pulse or is breathing):    Full Support       This patient has been examined wearing personal protective equipment.     I discussed the patient's findings and my recommendations with patient and nursing staff.      Signature:Electronically signed by Constantine Caldwell PA-C, 12/09/21, 2:31 PM EST.

## 2021-12-09 NOTE — PLAN OF CARE
Goal Outcome Evaluation:           Progress: improving  Outcome Summary: Pt had walking oximetry ordered but dropped to 88 just sitting on room air. Pt on 1L oxygen. No complaints. continue to monitor

## 2021-12-09 NOTE — PLAN OF CARE
Goal Outcome Evaluation:           Progress: improving  Outcome Summary: New admission from ED with hypokalemia. Patient also Covid positve (11/30/21). Patient denies pain or shortness of breath, remains on 2L NC with oxygen saturation 95-97%. Awaiting lab results this morning, potential discharge later today if potassium level improved. Will monitor.

## 2021-12-09 NOTE — ED PROVIDER NOTES
Subjective   71-year-old female on day 8 of febrile illness confirmed to be COVID-19.  The patient had a infusion of the Regeneron yesterday morning.  She states that she has not had no fever chills since then was had increasing shortness of breath.  The patient states that she has felt somewhat weak and achy today.  She states that she quit taking her potassium replacement several days ago because she felt as if it was making her nauseated.  She states that she has been following her oxygen level and has been in the 90 to 91% range for most of the day.  She reports no leg pain or swelling.  She denies night sweats or hemoptysis.  She reports that she has not had wheezing.  She reports that she has not had muscle cramps but has had muscle and joint achiness for over a week now          Review of Systems   Constitutional: Positive for fatigue.   Respiratory: Positive for cough, chest tightness and shortness of breath.    Cardiovascular: Negative for palpitations and leg swelling.   Gastrointestinal: Positive for nausea.   Genitourinary: Negative for flank pain.   Neurological: Negative for syncope.   Hematological: Does not bruise/bleed easily.   All other systems reviewed and are negative.      Past Medical History:   Diagnosis Date   • Allergic    • Allergic rhinitis due to pollen    • Arthritis    • Benign hypertension    • Female stress incontinence    • Gastroesophageal reflux disease without esophagitis    • History of tobacco use    • Injury of back    • Injury of neck    • Leg length discrepancy    • Menopausal syndrome    • Scoliosis, congenital    • Urge urinary incontinence    • Varus deformity of great toe, left        Allergies   Allergen Reactions   • Cinnamon Unknown - Low Severity   • Cortisone Itching   • Cucumber Extract Unknown - Low Severity   • Erythromycin Itching     hyper   • Penicillins Unknown - Low Severity     convulsions   • Tea Unknown - Low Severity       Past Surgical History:    Procedure Laterality Date   • BREAST BIOPSY Left    • BUNIONECTOMY     •  SECTION      x2   • TUBAL ABDOMINAL LIGATION         Family History   Problem Relation Age of Onset   • Heart disease Mother    • Stroke Mother    • Hypertension Brother    • Breast cancer Neg Hx    • Ovarian cancer Neg Hx        Social History     Socioeconomic History   • Marital status:    Tobacco Use   • Smoking status: Former Smoker     Packs/day: 0.50     Years: 35.00     Pack years: 17.50     Types: Cigarettes     Quit date: 2009     Years since quittin.9   • Smokeless tobacco: Never Used   Vaping Use   • Vaping Use: Never used   Substance and Sexual Activity   • Alcohol use: Yes     Alcohol/week: 3.0 - 5.0 standard drinks     Types: 3 - 5 Cans of beer per week   • Drug use: Never   • Sexual activity: Defer     Patient's  also received infusion of Regeneron      Objective   Physical Exam  Alert Bibiana Coma Scale 15   HEENT: Pupils equal and reactive to light. Conjunctivae are not injected. normal tympanic membranes. Oropharynx and nares are normal.   Neck: Supple. Midline trachea. No JVD. No goiter.   Chest: Bilateral rales are identified there are few rare bilateral expiratory wheezes and equal breath sounds bilaterally regular rate and rhythm without murmur or rub.   Abdomen: Positive bowel sounds nontender nondistended. No rebound or peritoneal signs. No CVA tenderness.   Extremities no clubbing cyanosis or edema motor sensory exam is normal the full range of motion is intact   skin: Warm and dry, no rashes or petechia.   Lymphatic: No regional lymphadenopathy. No calf pain, swelling or Hiren's sign    Procedures           ED Course                                 Labs Reviewed   COMPREHENSIVE METABOLIC PANEL - Abnormal; Notable for the following components:       Result Value    Glucose 126 (*)     Potassium 3.0 (*)     Chloride 94 (*)     ALT (SGPT) 47 (*)     AST (SGOT) 48 (*)     eGFR  "Non  Amer 57 (*)     All other components within normal limits    Narrative:     GFR Normal >60  Chronic Kidney Disease <60  Kidney Failure <15     CBC WITH AUTO DIFFERENTIAL - Abnormal; Notable for the following components:    Neutrophil % 77.3 (*)     Lymphocyte % 10.9 (*)     Eosinophil % 0.0 (*)     All other components within normal limits   TROPONIN (IN-HOUSE) - Normal    Narrative:     Troponin T Reference Range:  <= 0.03 ng/mL-   Negative for AMI  >0.03 ng/mL-     Abnormal for myocardial necrosis.  Clinicians would have to utilize clinical acumen, EKG, Troponin and serial changes to determine if it is an Acute Myocardial Infarction or myocardial injury due to an underlying chronic condition.       Results may be falsely decreased if patient taking Biotin.     PROCALCITONIN - Normal    Narrative:     As a Marker for Sepsis (Non-Neonates):     1. <0.5 ng/mL represents a low risk of severe sepsis and/or septic shock.  2. >2 ng/mL represents a high risk of severe sepsis and/or septic shock.    As a Marker for Lower Respiratory Tract Infections that require antibiotic therapy:  PCT on Admission     Antibiotic Therapy             6-12 Hrs later  >0.5                          Strongly Recommended            >0.25 - <0.5             Recommended  0.1 - 0.25                  Discouraged                       Remeasure/reassess PCT  <0.1                         Strongly Discouraged         Remeasure/reassess PCT      As 28 day mortality risk marker: \"Change in Procalcitonin Result\" (>80% or <=80%) if Day 0 (or Day 1) and Day 4 values are available. Refer to http://www.[employer]-pct-calculator.com/    Change in PCT <=80 %   A decrease of PCT levels below or equal to 80% defines a positive change in PCT test result representing a higher risk for 28-day all-cause mortality of patients diagnosed with severe sepsis or septic shock.    Change in PCT >80 %   A decrease of PCT levels of more than 80% defines a negative " change in PCT result representing a lower risk for 28-day all-cause mortality of patients diagnosed with severe sepsis or septic shock.               BLOOD CULTURE   CBC AND DIFFERENTIAL    Narrative:     The following orders were created for panel order CBC & Differential.  Procedure                               Abnormality         Status                     ---------                               -----------         ------                     CBC Auto Differential[[id number]]        Abnormal            Final result                 Please view results for these tests on the individual orders.     Medications   sodium chloride 0.9 % bolus 500 mL (0 mL Intravenous Stopped 12/8/21 1748)   albuterol sulfate HFA (PROVENTIL HFA;VENTOLIN HFA;PROAIR HFA) inhaler 2 puff (2 puffs Inhalation Given 12/8/21 1709)   dexamethasone (DECADRON) injection 4 mg (4 mg Intravenous Given 12/8/21 1717)   iopamidol (ISOVUE-370) 76 % injection 100 mL (100 mL Intravenous Given 12/8/21 1947)     CT Chest Pulmonary Embolism    Result Date: 12/8/2021  1.No definite evidence for pulmonary embolus. 2.There are groundglass changes within the lungs which have developed and could reflect sequela to Covid 19 3.There are pulmonary nodules within the upper lobes which have been noted and will need to be followed. 4.There is some fullness to the pancreatic head. This may just reflect volume averaging affects. A nonemergent MRI of the abdomen would be recommended to reassess. 5.Nodule right lobe thyroid. Ultrasound recommended to reassess this area. 6.Scoliosis with multilevel degenerative changes.  Electronically Signed By-Jarocho Torres MD On:12/8/2021 8:13 PM This report was finalized on [id number] by  Jarocho Torres MD.                    MDM  Number of Diagnoses or Management Options  Risk of Complications, Morbidity, and/or Mortality  Presenting problems: high  Diagnostic procedures: high  Management options: high  General comments: Oxygen levels  remained in the 90 to 92% range.  The patient was given potassium in the emergency department.  The patient stated she felt better with ER therapy.  The patient will be observed overnight for potassium replenishment and potential oxygen support we will continue IV steroids and bronchodilation.  The patient was agreeable to this plan of treatment        Final diagnoses:   Pneumonia due to COVID-19 virus   Hypokalemia       ED Disposition  ED Disposition     ED Disposition Condition Comment    Decision to Admit            No follow-up provider specified.       Medication List      No changes were made to your prescriptions during this visit.          Duran Woods MD  12/08/21 7702

## 2021-12-10 PROBLEM — E87.6 HYPOKALEMIA: Status: ACTIVE | Noted: 2021-12-10

## 2021-12-10 PROBLEM — R09.02 HYPOXIA: Status: ACTIVE | Noted: 2021-12-10

## 2021-12-10 LAB
POTASSIUM SERPL-SCNC: 3.8 MMOL/L (ref 3.5–5.2)
WHOLE BLOOD HOLD SPECIMEN: NORMAL

## 2021-12-10 PROCEDURE — 25010000002 DEXAMETHASONE PER 1 MG: Performed by: EMERGENCY MEDICINE

## 2021-12-10 PROCEDURE — 63710000001 ONDANSETRON ODT 4 MG TABLET DISPERSIBLE: Performed by: NURSE PRACTITIONER

## 2021-12-10 PROCEDURE — 94799 UNLISTED PULMONARY SVC/PX: CPT

## 2021-12-10 PROCEDURE — 99232 SBSQ HOSP IP/OBS MODERATE 35: CPT | Performed by: NURSE PRACTITIONER

## 2021-12-10 PROCEDURE — 25010000002 ONDANSETRON PER 1 MG: Performed by: EMERGENCY MEDICINE

## 2021-12-10 PROCEDURE — 84132 ASSAY OF SERUM POTASSIUM: CPT | Performed by: EMERGENCY MEDICINE

## 2021-12-10 RX ORDER — ONDANSETRON 4 MG/1
8 TABLET, ORALLY DISINTEGRATING ORAL ONCE
Status: COMPLETED | OUTPATIENT
Start: 2021-12-10 | End: 2021-12-10

## 2021-12-10 RX ADMIN — ALBUTEROL SULFATE 2 PUFF: 90 AEROSOL, METERED RESPIRATORY (INHALATION) at 00:18

## 2021-12-10 RX ADMIN — BENZONATATE 100 MG: 100 CAPSULE ORAL at 23:23

## 2021-12-10 RX ADMIN — ALBUTEROL SULFATE 2 PUFF: 90 AEROSOL, METERED RESPIRATORY (INHALATION) at 15:11

## 2021-12-10 RX ADMIN — SODIUM CHLORIDE, PRESERVATIVE FREE 10 ML: 5 INJECTION INTRAVENOUS at 19:56

## 2021-12-10 RX ADMIN — DEXAMETHASONE SODIUM PHOSPHATE 2 MG: 4 INJECTION, SOLUTION INTRAMUSCULAR; INTRAVENOUS at 01:04

## 2021-12-10 RX ADMIN — SODIUM CHLORIDE, PRESERVATIVE FREE 10 ML: 5 INJECTION INTRAVENOUS at 20:24

## 2021-12-10 RX ADMIN — ASPIRIN 325 MG ORAL TABLET 325 MG: 325 PILL ORAL at 12:33

## 2021-12-10 RX ADMIN — POTASSIUM CHLORIDE 20 MEQ: 1500 TABLET, EXTENDED RELEASE ORAL at 12:33

## 2021-12-10 RX ADMIN — POTASSIUM CHLORIDE 20 MEQ: 1500 TABLET, EXTENDED RELEASE ORAL at 19:56

## 2021-12-10 RX ADMIN — HYDROCHLOROTHIAZIDE 25 MG: 25 TABLET ORAL at 12:33

## 2021-12-10 RX ADMIN — ONDANSETRON 4 MG: 2 INJECTION INTRAMUSCULAR; INTRAVENOUS at 23:23

## 2021-12-10 RX ADMIN — DEXAMETHASONE SODIUM PHOSPHATE 2 MG: 4 INJECTION, SOLUTION INTRAMUSCULAR; INTRAVENOUS at 19:57

## 2021-12-10 RX ADMIN — DEXAMETHASONE SODIUM PHOSPHATE 2 MG: 4 INJECTION, SOLUTION INTRAMUSCULAR; INTRAVENOUS at 12:33

## 2021-12-10 RX ADMIN — ALBUTEROL SULFATE 2 PUFF: 90 AEROSOL, METERED RESPIRATORY (INHALATION) at 23:28

## 2021-12-10 RX ADMIN — SODIUM CHLORIDE, PRESERVATIVE FREE 10 ML: 5 INJECTION INTRAVENOUS at 12:34

## 2021-12-10 RX ADMIN — AMLODIPINE BESYLATE 10 MG: 5 TABLET ORAL at 12:33

## 2021-12-10 RX ADMIN — ONDANSETRON 8 MG: 4 TABLET, ORALLY DISINTEGRATING ORAL at 12:33

## 2021-12-10 RX ADMIN — ALBUTEROL SULFATE 2 PUFF: 90 AEROSOL, METERED RESPIRATORY (INHALATION) at 03:03

## 2021-12-10 RX ADMIN — ALBUTEROL SULFATE 2 PUFF: 90 AEROSOL, METERED RESPIRATORY (INHALATION) at 07:17

## 2021-12-10 NOTE — CASE MANAGEMENT/SOCIAL WORK
Continued Stay Note  TAWANA Barahona     Patient Name: Jess Mackay  MRN: [id number]  Today's Date: 12/10/2021    Admit Date: 12/8/2021     Discharge Plan     Row Name 12/10/21 1327       Plan    Plan DC plan: anticipate return home with O2.    Plan Comments Linda has delivered O2 for home. Pending potassium treatment.              Phone communication or documentation only - no physical contact with patient or family.      Megan Naegele, RN      Office Phone: [phone]  Office Cell: [phone]

## 2021-12-10 NOTE — PROGRESS NOTES
[facility] Medicine Services Daily Progress Note    Patient Name: Jess Mackay  : 1950  MRN: [id number]  Primary Care Physician:  Ivette Schaefer MD  Date of admission: 2021      Subjective      Chief Complaint: shortness of breath, cough    Ms. Mackay is a 71 year old female with PMH of hypertension, arthritis, GERD who presented to [facility] ED 2021 with complaints of worsening shortness of breath. She tested positive for Covid-19 on Dec 1 and started having symptoms on . Her symptoms started out as cough, congestion, body aches, fever, loss of taste and smell, nausea. Her shortness of breath developed on Tuesday. She was given Regeneron on 21 in the ED. Her shortness of breath continued to worsen so she returned to the ED. She was not vaccinated for Covid-19 due to having previous reactions to different types of medication.     : Patient requiring supplemental O2 with continued nausea and hypokalemia. She was admitted to the hospitalist group for further treatment of Covid-19 pneumonia with hypoxia.       Review of Systems   Constitutional: Positive for malaise/fatigue.   HENT: Negative.    Eyes: Negative.    Cardiovascular: Negative.    Respiratory: Positive for cough and shortness of breath.    Endocrine: Negative.    Hematologic/Lymphatic: Negative.    Skin: Negative.    Musculoskeletal: Negative.    Gastrointestinal: Positive for nausea.   Genitourinary: Negative.    Neurological: Positive for weakness.   Psychiatric/Behavioral: Negative.    Allergic/Immunologic: Negative.    All other systems reviewed and are negative.         Objective      Vitals:   Temp:  [97.8 °F (36.6 °C)-98.4 °F (36.9 °C)] 98.4 °F (36.9 °C)  Heart Rate:  [58-77] 58  Resp:  [16-20] 18  BP: (117-125)/(66-73) 117/69  Flow (L/min):  [1] 1    Physical Exam  Vitals and nursing note reviewed.   HENT:      Head: Normocephalic and atraumatic.   Eyes:       Extraocular Movements: Extraocular movements intact.      Pupils: Pupils are equal, round, and reactive to light.   Cardiovascular:      Rate and Rhythm: Normal rate and regular rhythm.      Pulses: Normal pulses.      Heart sounds: Normal heart sounds.   Pulmonary:      Effort: Pulmonary effort is normal.      Breath sounds: Examination of the right-lower field reveals decreased breath sounds. Examination of the left-lower field reveals decreased breath sounds. Decreased breath sounds present.   Abdominal:      General: Bowel sounds are normal.      Palpations: Abdomen is soft.      Tenderness: There is no abdominal tenderness.   Musculoskeletal:         General: Normal range of motion.      Cervical back: Normal range of motion.   Skin:     General: Skin is warm and dry.   Neurological:      Mental Status: She is alert and oriented to person, place, and time.   Psychiatric:         Mood and Affect: Mood normal.         Behavior: Behavior normal.           Result Review    Result Review:  I have personally reviewed the results from the time of this admission to 12/10/2021 18:13 EST and agree with these findings:  [x]  Laboratory  [x]  Microbiology  [x]  Radiology  []  EKG/Telemetry   []  Cardiology/Vascular   []  Pathology  []  Old records        Assessment/Plan        albuterol sulfate HFA, 2 puff, Inhalation, Q4H - RT  amLODIPine, 10 mg, Oral, Daily  aspirin, 325 mg, Oral, Daily  dexamethasone, 2 mg, Intravenous, Q8H  hydroCHLOROthiazide, 25 mg, Oral, Daily  potassium chloride, 20 mEq, Oral, TID With Meals  sodium chloride, 10 mL, Intravenous, Q12H             Active Hospital Problems:  Active Hospital Problems    Diagnosis    • **Pneumonia due to COVID-19 virus    • Hypoxia    • Hypokalemia    • Benign hypertension      Controlled compliant       Plan:     Covid-19 pneumonia  Hypoxia  -unvaccinated. pt stated she is sensitive to medications and this is why she did not get vaccinated.   -positive for Covid-19 on  Dec 1  -regeneron given 12/7/2021  -CT PE protocol shows no definite evidence of pulmonary embolism with groundglass changes within the lungs which could reflect sequela of COVID-19 infection with pulmonary nodules in the upper lobes noted previously along with some fullness of the pancreatic head, nodules in the right lobe of the thyroid and scoliosis  -pt requiring 1-2L O2 via nasal cannula  -on low dose decadron 2mg q8 hours  -albuterol 4x daily  -monitor oxygen saturation     Hypokalemia  -improved   -3.0 > 2.9 > 3.2 > 3.8    Hypertension  -Continue amlodipine and hydrochlorothiazide     Osteoporosis  -Encourage compliance with outpatient Fosamax therapy         DVT prophylaxis:  Mechanical DVT prophylaxis orders are present.    CODE STATUS:    Level Of Support Discussed With: Patient  Code Status (Patient has no pulse and is not breathing): CPR (Attempt to Resuscitate)  Medical Interventions (Patient has pulse or is breathing): Full Support      Disposition:  I expect patient to be discharged in the next 24 hours.    This patient has been examined wearing appropriate Personal Protective Equipment 12/10/21      Electronically signed by VANCE Suazo, 12/10/21, 18:13 EST.  [facility]ist [facility]

## 2021-12-10 NOTE — PLAN OF CARE
Goal Outcome Evaluation:  Plan of Care Reviewed With: patient           Outcome Summary: Pt rested well throughout the night no complaints voiced. Pt will possibly be discharged today. Will continue to monitor

## 2021-12-10 NOTE — PROGRESS NOTES
[facility] Daily Progress Note        LOS 0 days      Patient Care Team:  Ivette Schaefer MD as PCP - General  Pelon Montiel DC (Chiropractic Medicine)    Patient Location: 101/1      Subjective   Subjective     Chief Complaint / Subjective  Chief Complaint   Patient presents with   • Exposure To Known Illness     covid + last wednesday states she had antibody infusion yesterday and isn't any better         Brief Synopsis of Hospital Course/HPI  Obtained from admitting provider HPI on 12/8/2021:  71-year-old female on day 8 of febrile illness confirmed to be COVID-19.  The patient had a infusion of the Regeneron yesterday morning.  She states that she has not had no fever chills since then was had increasing shortness of breath.  The patient states that she has felt somewhat weak and achy today.  She states that she quit taking her potassium replacement several days ago because she felt as if it was making her nauseated.  She states that she has been following her oxygen level and has been in the 90 to 91% range for most of the day.  She reports no leg pain or swelling.  She denies night sweats or hemoptysis.  She reports that she has not had wheezing.  She reports that she has not had muscle cramps but has had muscle and joint achiness for over a week now     12/9/2021: Patient emergency HPI noted above including approximately 8-day history post Covid diagnosis with recent loss of taste and smell.  She notes some nausea and recently received monoclonal antibody infusion following Covid diagnosis.  Over the past several days she reports some increase in fatigue as well as a continued nonproductive cough and some dyspnea patient further notes that she has multiple medication allergies and has had adverse reaction to several medications and has been trying to space her medications farther apart however she has forgotten to take her potassium supplement for the past several days.          Date::  "12/10/2021: Patient was prepared for discharge yesterday after having walking oximetry showing need for home oxygen.  However, the patient was noted to have significantly low potassium level.  Overnight the patient had significant nausea and worsening of her shortness of breath.  She is requiring increased oxygen.  With patient's recent COVID-19 infection and abnormal CT showing groundglass opacities, the patient will be moved to inpatient status with continued steroid therapy.        Review of Systems   Constitutional: Positive for decreased appetite and night sweats.   Cardiovascular: Positive for dyspnea on exertion.   Respiratory: Positive for shortness of breath.    Gastrointestinal: Positive for nausea. Negative for vomiting.   All other systems reviewed and are negative.        Objective   Objective      Vital Signs  Temp:  [97.7 °F (36.5 °C)-98.4 °F (36.9 °C)] 98.4 °F (36.9 °C)  Heart Rate:  [59-77] 70  Resp:  [16-20] 18  BP: (117-138)/(66-82) 117/69  Oxygen Therapy  SpO2: 93 %  Pulse Oximetry Type: Intermittent  Device (Oxygen Therapy): nasal cannula  Flow (L/min): 1  Oxygen Concentration (%): 274  Probe Placed On (Pulse Ox):  (Spot checking Pulse Ox.  no probe on pt.)  Flowsheet Rows      First Filed Value   Admission Height 160 cm (63\") Documented at 12/08/2021 1545   Admission Weight 74.3 kg (163 lb 12.8 oz) Documented at 12/08/2021 1545        Intake & Output (last 3 days)       12/07 0701 12/08 0700 12/08 0701 12/09 0700 12/09 0701  12/10 0700 12/10 0701  12/11 0700    P.O.   360 360    IV Piggyback  500      Total Intake(mL/kg)  500 (6.7) 360 (4.8) 360 (4.8)    Urine (mL/kg/hr)   1 (0)     Stool   1     Total Output   2     Net  +500 +358 +360            Urine Unmeasured Occurrence   600 x         Lines, Drains & Airways     Active LDAs     Name Placement date Placement time Site Days    Peripheral IV 12/08/21 1712 Right Antecubital 12/08/21  1712  Antecubital  1                  Physical " Exam:    Physical Exam  Vitals and nursing note reviewed.   Constitutional:       Appearance: Normal appearance. She is ill-appearing.   HENT:      Head: Normocephalic.      Right Ear: External ear normal.      Left Ear: External ear normal.      Nose: Nose normal. No congestion or rhinorrhea.      Mouth/Throat:      Mouth: Mucous membranes are moist.   Eyes:      General: No scleral icterus.        Right eye: No discharge.         Left eye: No discharge.      Extraocular Movements: Extraocular movements intact.      Conjunctiva/sclera: Conjunctivae normal.      Pupils: Pupils are equal, round, and reactive to light.   Cardiovascular:      Rate and Rhythm: Normal rate and regular rhythm.      Pulses: Normal pulses.      Heart sounds: Normal heart sounds. No murmur heard.  No friction rub.   Pulmonary:      Effort: No respiratory distress.      Breath sounds: No stridor. No rhonchi.      Comments: Mild pursed lip breathing  Abdominal:      General: Bowel sounds are normal.      Palpations: Abdomen is soft.   Musculoskeletal:         General: Normal range of motion.      Cervical back: Normal range of motion and neck supple.      Right lower leg: No edema.      Left lower leg: No edema.   Skin:     General: Skin is warm and dry.      Capillary Refill: Capillary refill takes less than 2 seconds.   Neurological:      General: No focal deficit present.      Mental Status: She is alert and oriented to person, place, and time.   Psychiatric:         Mood and Affect: Mood normal.         Behavior: Behavior normal.         Thought Content: Thought content normal.         Judgment: Judgment normal.               Procedures:              Results Review:     I reviewed the patient's new clinical results.      Lab Results (last 24 hours)     Procedure Component Value Units Date/Time    Potassium [[id number]]  (Normal) Collected: 12/10/21 0626    Specimen: Blood Updated: 12/10/21 0734     Potassium 3.8 mmol/L     Extra Tubes  [[id number]] Collected: 12/10/21 0626    Specimen: Blood, Venous Line Updated: 12/10/21 0730    Narrative:      The following orders were created for panel order Extra Tubes.  Procedure                               Abnormality         Status                     ---------                               -----------         ------                     Lavender Top[[id number]]                                     Final result                 Please view results for these tests on the individual orders.    Lavender Top [[id number]] Collected: 12/10/21 0626    Specimen: Blood Updated: 12/10/21 0730     Extra Tube hold for add-on     Comment: Auto resulted       Potassium [[id number]]  (Abnormal) Collected: 12/09/21 1659    Specimen: Blood Updated: 12/09/21 1735     Potassium 3.2 mmol/L     Blood Culture - Blood, Arm, Right [[id number]]  (Normal) Collected: 12/08/21 1711    Specimen: Blood from Arm, Right Updated: 12/09/21 1731     Blood Culture No growth at 24 hours        No results found for: HGBA1C            No results found for: LIPASE  Lab Results   Component Value Date    CHOL 194 10/09/2018    CHLPL 183 11/30/2020    TRIG 66 11/30/2020    HDL 76 11/30/2020    LDL 95 11/30/2020       No results found for: INTRAOP, PREDX, FINALDX, COMDX    Microbiology Results (last 10 days)     Procedure Component Value - Date/Time    Blood Culture - Blood, Arm, Right [[id number]]  (Normal) Collected: 12/08/21 1711    Lab Status: Preliminary result Specimen: Blood from Arm, Right Updated: 12/09/21 1731     Blood Culture No growth at 24 hours          ECG/EMG Results (most recent)     None                  CT Chest Pulmonary Embolism    Result Date: 12/8/2021  1.No definite evidence for pulmonary embolus. 2.There are groundglass changes within the lungs which have developed and could reflect sequela to Covid 19 3.There are pulmonary nodules within the upper lobes which have been noted and will need to be followed. 4.There is some fullness  to the pancreatic head. This may just reflect volume averaging affects. A nonemergent MRI of the abdomen would be recommended to reassess. 5.Nodule right lobe thyroid. Ultrasound recommended to reassess this area. 6.Scoliosis with multilevel degenerative changes.  Electronically Signed By-Jarocho Torres MD On:12/8/2021 8:13 PM This report was finalized on [id number] by  Jarocho Torres MD.          Xrays, labs reviewed personally by physician.    Medication Review:   I have reviewed the patient's current medication list      Scheduled Meds  albuterol sulfate HFA, 2 puff, Inhalation, Q4H - RT  amLODIPine, 10 mg, Oral, Daily  aspirin, 325 mg, Oral, Daily  dexamethasone, 2 mg, Intravenous, Q8H  hydroCHLOROthiazide, 25 mg, Oral, Daily  potassium chloride, 20 mEq, Oral, TID With Meals  sodium chloride, 10 mL, Intravenous, Q12H        Meds Infusions       Meds PRN  •  acetaminophen  •  benzonatate  •  melatonin  •  ondansetron  •  potassium chloride **OR** potassium chloride **OR** potassium chloride  •  sodium chloride        Assessment/Plan   Assessment/Plan     Active Hospital Problems    Diagnosis  POA   • Pneumonia due to COVID-19 virus [U07.1, J12.82]  Yes      Resolved Hospital Problems   No resolved problems to display.       MEDICAL DECISION MAKING COMPLEXITY BY PROBLEM:       Shortness of breath--secondary to COVID-19 pneumonia superimposed on chronic emphysema: Continue oxygen support    COVID 19 pneumonia: Continue dexamethasone 2 mg every 8 hours    Fatigue (likely multifactorial related to Covid19 pneumonia and hypokalemia)  -Patient positive for COVID-19 on 11/30/2021  -WBCs: 7.90 with normal neutrophil and lymphocyte count noted on differential, platelets: 241  -CT PE protocol shows no definite evidence of pulmonary embolism with groundglass changes within the lungs which could reflect sequela of COVID-19 infection with pulmonary nodules in the upper lobes noted previously along with some fullness of the  pancreatic head, nodules in the right lobe of the thyroid and scoliosis  -Potassium initially 3.0 and trended down to 2.9 following replacement in the ED, further replacement protocol ordered  -Magnesium: 1.8  -Check TSH  -Continue steroids, ProAir, Tessalon and Mucinex  -Maintain isolation precautions  -Had hoped for discharge today however patient reports she continues to have significant fatigue as well as dyspnea especially with exertion.  We will continue breathing treatments as well as potassium replacement and monitoring with hopeful discharge tomorrow.  Patient may need home O2 at discharge     Hypertension  -Well controlled with a most recent blood pressure of 127/62  - Continue amlodipine and hydrochlorothiazide  - Monitor while admitted     Osteoporosis  -Encourage compliance with outpatient Fosamax therapy       VTE Prophylaxis -   Mechanical Order History:      Ordered        12/08/21 2330  Place Sequential Compression Device  Once            12/08/21 2330  Maintain Sequential Compression Device  Continuous                    Pharmalogical Order History:     None            Code Status -   Code Status and Medical Interventions:   Ordered at: 12/08/21 2210     Level Of Support Discussed With:    Patient     Code Status (Patient has no pulse and is not breathing):    CPR (Attempt to Resuscitate)     Medical Interventions (Patient has pulse or is breathing):    Full Support       This patient has been examined wearing appropriate Personal Protective Equipment. 12/10/21        Discharge Planning  Patient is having increased work of breathing, increased oxygen demand: We will request hospitalist for inpatient admission        Electronically signed by VANCE Guerra, 12/10/21, 15:13 EST.  [facility]ist Team

## 2021-12-11 ENCOUNTER — READMISSION MANAGEMENT (OUTPATIENT)
Dept: CALL CENTER | Facility: HOSPITAL | Age: 71
End: 2021-12-11

## 2021-12-11 VITALS
WEIGHT: 164.02 LBS | HEART RATE: 43 BPM | OXYGEN SATURATION: 95 % | DIASTOLIC BLOOD PRESSURE: 72 MMHG | BODY MASS INDEX: 29.06 KG/M2 | TEMPERATURE: 98.2 F | HEIGHT: 63 IN | SYSTOLIC BLOOD PRESSURE: 126 MMHG | RESPIRATION RATE: 18 BRPM

## 2021-12-11 PROBLEM — D89.831 CYTOKINE RELEASE SYNDROME, GRADE 1: Status: ACTIVE | Noted: 2021-12-11

## 2021-12-11 LAB
ANION GAP SERPL CALCULATED.3IONS-SCNC: 11 MMOL/L (ref 5–15)
BUN SERPL-MCNC: 13 MG/DL (ref 8–23)
BUN/CREAT SERPL: 17.1 (ref 7–25)
CALCIUM SPEC-SCNC: 9 MG/DL (ref 8.6–10.5)
CHLORIDE SERPL-SCNC: 106 MMOL/L (ref 98–107)
CO2 SERPL-SCNC: 23 MMOL/L (ref 22–29)
CREAT SERPL-MCNC: 0.76 MG/DL (ref 0.57–1)
GFR SERPL CREATININE-BSD FRML MDRD: 75 ML/MIN/1.73
GLUCOSE SERPL-MCNC: 124 MG/DL (ref 65–99)
MAGNESIUM SERPL-MCNC: 1.9 MG/DL (ref 1.6–2.4)
POTASSIUM SERPL-SCNC: 4.1 MMOL/L (ref 3.5–5.2)
SODIUM SERPL-SCNC: 140 MMOL/L (ref 136–145)

## 2021-12-11 PROCEDURE — 94799 UNLISTED PULMONARY SVC/PX: CPT

## 2021-12-11 PROCEDURE — 83735 ASSAY OF MAGNESIUM: CPT | Performed by: INTERNAL MEDICINE

## 2021-12-11 PROCEDURE — 99239 HOSP IP/OBS DSCHRG MGMT >30: CPT | Performed by: INTERNAL MEDICINE

## 2021-12-11 PROCEDURE — 80048 BASIC METABOLIC PNL TOTAL CA: CPT | Performed by: INTERNAL MEDICINE

## 2021-12-11 PROCEDURE — 25010000002 DEXAMETHASONE PER 1 MG: Performed by: EMERGENCY MEDICINE

## 2021-12-11 RX ORDER — ALBUTEROL SULFATE 90 UG/1
2 AEROSOL, METERED RESPIRATORY (INHALATION)
Qty: 8 G | Refills: 0 | Status: SHIPPED | OUTPATIENT
Start: 2021-12-11 | End: 2021-12-11

## 2021-12-11 RX ORDER — DEXAMETHASONE 6 MG/1
6 TABLET ORAL
Qty: 5 TABLET | Refills: 0 | Status: SHIPPED | OUTPATIENT
Start: 2021-12-11 | End: 2021-12-16

## 2021-12-11 RX ORDER — ALBUTEROL SULFATE 90 UG/1
2 AEROSOL, METERED RESPIRATORY (INHALATION)
Qty: 8.5 G | Refills: 0 | Status: SHIPPED | OUTPATIENT
Start: 2021-12-11

## 2021-12-11 RX ADMIN — ASPIRIN 325 MG ORAL TABLET 325 MG: 325 PILL ORAL at 08:12

## 2021-12-11 RX ADMIN — ALBUTEROL SULFATE 2 PUFF: 90 AEROSOL, METERED RESPIRATORY (INHALATION) at 07:07

## 2021-12-11 RX ADMIN — DEXAMETHASONE SODIUM PHOSPHATE 2 MG: 4 INJECTION, SOLUTION INTRAMUSCULAR; INTRAVENOUS at 01:17

## 2021-12-11 RX ADMIN — DEXAMETHASONE SODIUM PHOSPHATE 2 MG: 4 INJECTION, SOLUTION INTRAMUSCULAR; INTRAVENOUS at 08:12

## 2021-12-11 RX ADMIN — AMLODIPINE BESYLATE 10 MG: 5 TABLET ORAL at 08:12

## 2021-12-11 RX ADMIN — HYDROCHLOROTHIAZIDE 25 MG: 25 TABLET ORAL at 08:12

## 2021-12-11 RX ADMIN — SODIUM CHLORIDE, PRESERVATIVE FREE 10 ML: 5 INJECTION INTRAVENOUS at 08:12

## 2021-12-11 RX ADMIN — POTASSIUM CHLORIDE 20 MEQ: 1500 TABLET, EXTENDED RELEASE ORAL at 08:12

## 2021-12-11 RX ADMIN — ALBUTEROL SULFATE 2 PUFF: 90 AEROSOL, METERED RESPIRATORY (INHALATION) at 03:46

## 2021-12-11 NOTE — OUTREACH NOTE
Prep Survey      Responses   [facility] patient discharged from? [facility]   Is LACE score < 7 ? No   Emergency Room discharge w/ pulse ox? No   Eligibility [facility]   Date of Admission 12/08/21   Date of Discharge 12/11/21   Discharge Disposition Home or Self Care   Discharge diagnosis Pneumonia due to COVID-19 virus    Does the patient have one of the following disease processes/diagnoses(primary or secondary)? COVID-19   Does the patient have Home health ordered? No   Is there a DME ordered? Yes   What DME was ordered? [facility] has delivered O2 for home   Prep survey completed? Yes          Marina Rosario RN

## 2021-12-11 NOTE — NURSING NOTE
Pt without any complaints; states she is breathing easier; less labored; pt on oxygen at 1 lpm via NC. Rash on her feet is decreasing in irritation and size.

## 2021-12-11 NOTE — PLAN OF CARE
Goal Outcome Evaluation:  Plan of Care Reviewed With: patient           Outcome Summary: pt scheduled for discharge this am

## 2021-12-11 NOTE — PLAN OF CARE
Problem: Adult Inpatient Plan of Care  Goal: Plan of Care Review  Outcome: Ongoing, Progressing  Goal: Patient-Specific Goal (Individualized)  Outcome: Ongoing, Progressing  Goal: Absence of Hospital-Acquired Illness or Injury  Outcome: Ongoing, Progressing  Intervention: Identify and Manage Fall Risk  Recent Flowsheet Documentation  Taken 12/11/2021 0301 by Jess Edwards RN  Safety Promotion/Fall Prevention: safety round/check completed  Taken 12/11/2021 0300 by Jess Edwards RN  Safety Promotion/Fall Prevention: safety round/check completed  Taken 12/11/2021 0100 by Jess Edwards RN  Safety Promotion/Fall Prevention: safety round/check completed  Taken 12/10/2021 2300 by Jess Edwards RN  Safety Promotion/Fall Prevention: safety round/check completed  Taken 12/10/2021 2024 by Jess Edwards RN  Safety Promotion/Fall Prevention: safety round/check completed  Taken 12/10/2021 1933 by Jess Edwards RN  Safety Promotion/Fall Prevention: safety round/check completed  Intervention: Prevent Skin Injury  Recent Flowsheet Documentation  Taken 12/11/2021 0301 by Jess Edwards RN  Body Position: position changed independently  Taken 12/10/2021 1933 by Jess Edwards RN  Body Position:   position changed independently   sitting up in bed  Goal: Optimal Comfort and Wellbeing  Outcome: Ongoing, Progressing  Goal: Readiness for Transition of Care  Outcome: Ongoing, Progressing     Problem: Hypertension Comorbidity  Goal: Blood Pressure in Desired Range  Outcome: Ongoing, Progressing   Goal Outcome Evaluation:

## 2021-12-11 NOTE — PLAN OF CARE
Goal Outcome Evaluation:  Plan of Care Reviewed With: patient        Progress: improving  Outcome Summary: Pt had some nausea this morning. ODT Zofran given with relief. Pt has her oxygen for home use in the room with her. continue to monitor.

## 2021-12-11 NOTE — DISCHARGE SUMMARY
[facility] Medicine Services  DISCHARGE SUMMARY    Patient Name: Jess Mackay  : 1950  MRN: [id number]    Date of Admission: 2021  Date of Discharge: 2021  Primary Care Physician: Ivette Schaefer MD      Presenting Problem:   Hypokalemia [E87.6]  Pneumonia due to COVID-19 virus [U07.1, J12.82]    Active and Resolved Hospital Problems:  Active Hospital Problems    Diagnosis POA   • **Pneumonia due to COVID-19 virus [U07.1, J12.82] Yes   • Cytokine release syndrome, grade 1 [D89.831] No   • Hypoxia [R09.02] Yes   • Hypokalemia [E87.6] Yes   • Benign hypertension [I10] Yes      Resolved Hospital Problems   No resolved problems to display.         Hospital Course     Hospital Course:  Jess Mackay is a 71 y.o. female with PMH of hypertension, arthritis, GERD who presented to [facility] ED 2021 with complaints of worsening shortness of breath. She tested positive for Covid-19 on Dec 1 and started having symptoms on . Her symptoms started out as cough, congestion, body aches, fever, loss of taste and smell, nausea. Her shortness of breath developed on Tuesday. She was given Regeneron on 21 in the ED. Her shortness of breath continued to worsen so she returned to the ED. She was not vaccinated for Covid-19 due to having previous reactions to different types of medication.  Patient required on 1-2 L nasal cannula during this admission, qualified for home O2 and set up by case management.  Started on dexamethasone with improvement.  She did have some hypokalemia and nausea which improved after replacement.  She feels much better and wants to go home today.  Patient is clinically improved and stable to discharge home today with follow-up with PCP as an outpatient.        DISCHARGE Follow Up Recommendations for labs and diagnostics: Follow-up with PCP      Reasons For Change In Medications and Indications for New  Medications:  Medications as below    Day of Discharge     Vital Signs:  Temp:  [97.8 °F (36.6 °C)-98.4 °F (36.9 °C)] 98.2 °F (36.8 °C)  Heart Rate:  [43-72] 43  Resp:  [16-18] 18  BP: (117-130)/(69-77) 126/72  Flow (L/min):  [1] 1    Physical Exam:  General: Awake, alert, NAD  Eyes: PERRL, EOMI, conjunctive are clear  Cardiovascular: Regular rate and rhythm, no murmurs  Respiratory: Clear to auscultation bilaterally, no wheezing or rales, unlabored breathing  Abdomen: Soft, nontender, positive bowel sounds, no guarding  Neurologic: A&O, CN grossly intact, moves all extremities spontaneously  Musculoskeletal: Normal range of motion, no deformities  Skin: Warm, dry, intact        Pertinent  and/or Most Recent Results     LAB RESULTS:      Lab 12/09/21  0751 12/08/21  1711   WBC 5.50 7.10   HEMOGLOBIN 13.9 14.3   HEMATOCRIT 40.5 41.6   PLATELETS 261 241   NEUTROS ABS 3.60 5.50   LYMPHS ABS 1.10 0.80   MONOS ABS 0.80 0.80   EOS ABS 0.00 0.00   MCV 89.2 88.2   PROCALCITONIN  --  0.05         Lab 12/11/21  0805 12/10/21  0626 12/09/21  1659 12/09/21  0751 12/08/21  1711   SODIUM 140  --   --  143 137   POTASSIUM 4.1 3.8 3.2* 2.9* 3.0*   CHLORIDE 106  --   --  103 94*   CO2 23.0  --   --  27.0 28.0   ANION GAP 11.0  --   --  13.0 15.0   BUN 13  --   --  19 17   CREATININE 0.76  --   --  0.90 0.96   GLUCOSE 124*  --   --  141* 126*   CALCIUM 9.0  --   --  8.9 9.4   MAGNESIUM 1.9  --   --  1.8  --    TSH  --   --   --  0.297  --          Lab 12/08/21  1711   TOTAL PROTEIN 7.4   ALBUMIN 4.20   GLOBULIN 3.2   ALT (SGPT) 47*   AST (SGOT) 48*   BILIRUBIN 0.6   ALK PHOS 56         Lab 12/09/21  0751 12/08/21  1711   TROPONIN T <0.010 <0.010                 Brief Urine Lab Results     None        Microbiology Results (last 10 days)     Procedure Component Value - Date/Time    Blood Culture - Blood, Arm, Right [[id number]]  (Normal) Collected: 12/08/21 1711    Lab Status: Preliminary result Specimen: Blood from Arm, Right Updated:  12/10/21 1731     Blood Culture No growth at 2 days          CT Chest Pulmonary Embolism    Result Date: 12/8/2021  Impression: 1.No definite evidence for pulmonary embolus. 2.There are groundglass changes within the lungs which have developed and could reflect sequela to Covid 19 3.There are pulmonary nodules within the upper lobes which have been noted and will need to be followed. 4.There is some fullness to the pancreatic head. This may just reflect volume averaging affects. A nonemergent MRI of the abdomen would be recommended to reassess. 5.Nodule right lobe thyroid. Ultrasound recommended to reassess this area. 6.Scoliosis with multilevel degenerative changes.  Electronically Signed By-Jarocho Torres MD On:12/8/2021 8:13 PM This report was finalized on [id number] by  Jarocho Torres MD.                  Labs Pending at Discharge:  Pending Labs     Order Current Status    Blood Culture - Blood, Arm, Right Preliminary result          Procedures Performed           Consults:   Consults     No orders found from 11/9/2021 to 12/9/2021.            Discharge Details        Discharge Medications      New Medications      Instructions Start Date   albuterol sulfate  (90 Base) MCG/ACT inhaler  Commonly known as: PROVENTIL HFA;VENTOLIN HFA;PROAIR HFA   2 puffs, Inhalation, Every 4 Hours - RT      dexamethasone 6 MG tablet  Commonly known as: DECADRON   6 mg, Oral, Daily With Breakfast         Continue These Medications      Instructions Start Date   alendronate 70 MG tablet  Commonly known as: FOSAMAX   70 mg, Oral, Every 7 Days, Take with 8 ounces of water 30 minutes before first food, drink, or medication. Avoid lying down for 30 minutes      amLODIPine 10 MG tablet  Commonly known as: NORVASC   10 mg, Oral, Daily      aspirin 325 MG tablet   325 mg, Oral, Daily      benzonatate 100 MG capsule  Commonly known as: TESSALON   100 mg, Oral, 3 Times Daily PRN      fexofenadine 180 MG tablet  Commonly known as:  ALLEGRA   180 mg, Oral, Daily      hydroCHLOROthiazide 25 MG tablet  Commonly known as: HYDRODIURIL   25 mg, Oral, Daily      potassium chloride 10 MEQ CR tablet   10 mEq, Oral, Daily         Stop These Medications    doxycycline 100 MG capsule  Commonly known as: MONODOX     predniSONE 10 MG tablet  Commonly known as: DELTASONE            Allergies   Allergen Reactions   • Cinnamon Unknown - Low Severity   • Cortisone Itching   • Cucumber Extract Unknown - Low Severity   • Erythromycin Itching     hyper   • Penicillins Unknown - Low Severity     convulsions   • Tea Unknown - Low Severity         Discharge Disposition:  Home or Self Care    Diet:  Hospital:  Diet Order   Procedures   • Diet Regular         Discharge Activity:         CODE STATUS:  Code Status and Medical Interventions:   Ordered at: 12/08/21 2210     Level Of Support Discussed With:    Patient     Code Status (Patient has no pulse and is not breathing):    CPR (Attempt to Resuscitate)     Medical Interventions (Patient has pulse or is breathing):    Full Support         No future appointments.        Time spent on Discharge including face to face service:  35 minutes    Signature:    Electronically signed by Thuan Luis DO, 12/11/21, 9:44 AM EST.

## 2021-12-12 ENCOUNTER — TRANSITIONAL CARE MANAGEMENT TELEPHONE ENCOUNTER (OUTPATIENT)
Dept: CALL CENTER | Facility: HOSPITAL | Age: 71
End: 2021-12-12

## 2021-12-12 NOTE — OUTREACH NOTE
Call Center TCM Note      Responses   [facility] patient discharged from? Jun   Does the patient have one of the following disease processes/diagnoses(primary or secondary)? COVID-19   COVID-19 underlying condition? None   TCM attempt successful? Yes   Call start time 1427   Call end time 1433   Discharge diagnosis Pneumonia due to COVID-19 virus    Meds reviewed with patient/caregiver? Yes   Is the patient having any side effects they believe may be caused by any medication additions or changes? No   Does the patient have all medications ordered at discharge? Yes   Is the patient taking all medications as directed (includes completed medication regime)? Yes   Does the patient have a primary care provider?  Yes   Comments regarding PCP No available HOSP DC FU appt ( COVID) noted that meets TCM guidelines. Will route to PCP office for an appt.    Does the patient have an appointment with their PCP or specialist within 7 days of discharge? No   What is preventing the patient from scheduling follow up appointments within 7 days of discharge? Earlier appointment not available   Nursing Interventions --  [ROUTE to office]   Has the patient kept scheduled appointments due by today? N/A   What DME was ordered? Linda has delivered O2 for home   Has all DME been delivered? Yes   Psychosocial issues? No   Did the patient receive a copy of their discharge instructions? Yes   Did the patient receive a copy of COVID-19 specific instructions? Yes   Nursing interventions Reviewed instructions with patient   What is the patient's perception of their health status since discharge? Improving   Does the patient have any of the following symptoms? Loss of taste/smell   Nursing Interventions Nurse provided patient education   Pulse Ox monitoring Intermittent   Pulse Ox device source Patient   O2 Sat comments 94-93% with 2L    O2 Sat: education provided Sat levels,  Monitoring frequency,  When to seek care   Is the  patient/caregiver able to teach back steps to recovery at home? Eat a well-balance diet,  Rest and rebuild strength, gradually increase activity   If the patient is a current smoker, are they able to teach back resources for cessation? Not a smoker   Is the patient/caregiver able to teach back the hierarchy of who to call/visit for symptoms/problems? PCP, Specialist, Home health nurse, Urgent Care, ED, 911 Yes   TCM call completed? Yes   Wrap up additional comments Pt reports she is doing better. No needs at this time.           Soo Bazzi RN    12/12/2021, 14:33 EST

## 2021-12-13 ENCOUNTER — READMISSION MANAGEMENT (OUTPATIENT)
Dept: CALL CENTER | Facility: HOSPITAL | Age: 71
End: 2021-12-13

## 2021-12-13 LAB — BACTERIA SPEC AEROBE CULT: NORMAL

## 2021-12-13 NOTE — CASE MANAGEMENT/SOCIAL WORK
Case Management Discharge Note                Selected Continued Care - Discharged on 12/11/2021 Admission date: 12/8/2021 - Discharge disposition: Home or Self Care          Final Discharge Disposition Code: 01 - home or self-care

## 2021-12-13 NOTE — OUTREACH NOTE
COVID-19 Week 1 Survey      Responses   [facility] patient discharged from? Jun   Does the patient have one of the following disease processes/diagnoses(primary or secondary)? COVID-19   COVID-19 underlying condition? None   Call Number Call 2   Week 1 Call successful? Yes   Call start time 0927   Call end time 0932   Discharge diagnosis Pneumonia due to COVID-19 virus    Meds reviewed with patient/caregiver? Yes   Is the patient having any side effects they believe may be caused by any medication additions or changes? No   Does the patient have all medications ordered at discharge? Yes   Is the patient taking all medications as directed (includes completed medication regime)? Yes   Does the patient have a primary care provider?  Yes   Does the patient have an appointment with their PCP or specialist within 7 days of discharge? No   What is preventing the patient from scheduling follow up appointments within 7 days of discharge? Haven't had time  [will call to schedule appt today]   Nursing Interventions Advised patient to make appointment   Has the patient kept scheduled appointments due by today? N/A   Has home health visited the patient within 72 hours of discharge? N/A   What DME was ordered? Linda has delivered O2 for home   Has all DME been delivered? Yes   Psychosocial issues? No   Nursing interventions Reviewed instructions with patient   What is the patient's perception of their health status since discharge? Improving   Does the patient have any of the following symptoms? Cough   Nursing Interventions Nurse provided patient education   Pulse Ox monitoring Intermittent   Pulse Ox device source Patient   O2 Sat comments 93-94% on 2L O2   O2 Sat: education provided Sat levels,  Monitoring frequency,  When to seek care   Is the patient/caregiver able to teach back steps to recovery at home? Set small, achievable goals for return to baseline health,  Rest and rebuild strength, gradually increase  activity,  Eat a well-balance diet   Is the patient/caregiver able to teach back the hierarchy of who to call/visit for symptoms/problems? PCP, Specialist, Home health nurse, Urgent Care, ED, 911 Yes   COVID-19 call completed? Yes          ANDRÉS VILLAVICENCIO RN

## 2021-12-14 ENCOUNTER — READMISSION MANAGEMENT (OUTPATIENT)
Dept: CALL CENTER | Facility: HOSPITAL | Age: 71
End: 2021-12-14

## 2021-12-15 ENCOUNTER — OFFICE VISIT (OUTPATIENT)
Dept: FAMILY MEDICINE CLINIC | Facility: CLINIC | Age: 71
End: 2021-12-15

## 2021-12-15 VITALS
WEIGHT: 164.2 LBS | TEMPERATURE: 97.5 F | DIASTOLIC BLOOD PRESSURE: 80 MMHG | HEART RATE: 74 BPM | SYSTOLIC BLOOD PRESSURE: 128 MMHG | BODY MASS INDEX: 29.09 KG/M2 | HEIGHT: 63 IN | RESPIRATION RATE: 18 BRPM | OXYGEN SATURATION: 95 %

## 2021-12-15 DIAGNOSIS — Z87.891 HISTORY OF TOBACCO USE: ICD-10-CM

## 2021-12-15 DIAGNOSIS — E66.3 OVERWEIGHT WITH BODY MASS INDEX (BMI) OF 29 TO 29.9 IN ADULT: ICD-10-CM

## 2021-12-15 DIAGNOSIS — Z86.16 HISTORY OF COVID-19: ICD-10-CM

## 2021-12-15 DIAGNOSIS — E04.1 THYROID NODULE: ICD-10-CM

## 2021-12-15 DIAGNOSIS — R93.89 ABNORMAL CT SCAN, CHEST: ICD-10-CM

## 2021-12-15 DIAGNOSIS — IMO0001 LUNG NODULE < 6CM ON CT: ICD-10-CM

## 2021-12-15 DIAGNOSIS — Z87.01 HISTORY OF PNEUMONIA: Primary | ICD-10-CM

## 2021-12-15 DIAGNOSIS — I10 BENIGN HYPERTENSION: ICD-10-CM

## 2021-12-15 PROCEDURE — 99496 TRANSJ CARE MGMT HIGH F2F 7D: CPT | Performed by: FAMILY MEDICINE

## 2021-12-15 PROCEDURE — 1111F DSCHRG MED/CURRENT MED MERGE: CPT | Performed by: FAMILY MEDICINE

## 2021-12-15 RX ORDER — ERGOCALCIFEROL 1.25 MG/1
50000 CAPSULE ORAL WEEKLY
COMMUNITY

## 2021-12-16 DIAGNOSIS — R93.89 ABNORMAL CT SCAN, CHEST: Primary | ICD-10-CM

## 2021-12-17 ENCOUNTER — READMISSION MANAGEMENT (OUTPATIENT)
Dept: CALL CENTER | Facility: HOSPITAL | Age: 71
End: 2021-12-17

## 2021-12-17 ENCOUNTER — TELEPHONE (OUTPATIENT)
Dept: FAMILY MEDICINE CLINIC | Facility: CLINIC | Age: 71
End: 2021-12-17

## 2021-12-17 DIAGNOSIS — I10 BENIGN HYPERTENSION: ICD-10-CM

## 2021-12-17 RX ORDER — AMLODIPINE BESYLATE 10 MG/1
TABLET ORAL
Qty: 30 TABLET | Refills: 4 | Status: SHIPPED | OUTPATIENT
Start: 2021-12-17 | End: 2022-04-08

## 2021-12-17 NOTE — OUTREACH NOTE
"COVID-19 Week 2 Survey      Responses   [facility] patient discharged from? Jun   Does the patient have one of the following disease processes/diagnoses(primary or secondary)? COVID-19   COVID-19 underlying condition? None   Call Number Call 1   COVID-19 Week 2: Call 1 attempt successful? Yes   Call start time 1237   Call end time 1241   Meds reviewed with patient/caregiver? Yes   Is the patient having any side effects they believe may be caused by any medication additions or changes? No   Does the patient have all medications ordered at discharge? Yes   Is the patient taking all medications as directed (includes completed medication regime)? Yes   Does the patient have a primary care provider?  Yes   Does the patient have an appointment with their PCP or specialist within 7 days of discharge? Yes   Has the patient kept scheduled appointments due by today? Yes   Has home health visited the patient within 72 hours of discharge? N/A   DME comments States has not needed home O2 today.   Psychosocial issues? No   What is the patient's perception of their health status since discharge? Improving   Does the patient have any of the following symptoms? Cough   Pulse Ox monitoring Intermittent   Pulse Ox device source Patient   O2 Sat comments 95% on RA.   Is the patient/caregiver able to teach back the hierarchy of who to call/visit for symptoms/problems? PCP, Specialist, Home health nurse, Urgent Care, ED, 911 Yes   COVID-19 call completed? Yes   Wrap up additional comments States continues to improve. Reports cough improved-not using home O2 today with sats staying \"good\". Denies any needs today.          Temi Damon RN  "

## 2021-12-17 NOTE — TELEPHONE ENCOUNTER
Tona at [facility] called requesting faxed order for MRI abdomen to be faxed to  [phone]

## 2021-12-23 ENCOUNTER — READMISSION MANAGEMENT (OUTPATIENT)
Dept: CALL CENTER | Facility: HOSPITAL | Age: 71
End: 2021-12-23

## 2021-12-23 NOTE — OUTREACH NOTE
COVID-19 Week 3 Survey      Responses   [facility] patient discharged from? Jun   Does the patient have one of the following disease processes/diagnoses(primary or secondary)? COVID-19   COVID-19 underlying condition? None   Call Number Call 1   COVID-19 Week 3: Call 1 attempt successful? Yes   Call start time 1211   Call end time 1217   Discharge diagnosis Pneumonia due to COVID-19 virus    Has the patient kept scheduled appointments due by today? Yes   Comments Saw PCP   What DME was ordered? Not using O2 called Nathalie to  O2 but they need a release fron Dr Schaefer   What is the patient's perception of their health status since discharge? Improving  [Still fatigued but building strength, going out shopping today]   Nursing Interventions Nurse provided patient education   Pulse Ox monitoring Intermittent   O2 Sat comments O2 94% RA not using oxygen   COVID-19 call completed? Yes   Wrap up additional comments Will be going to [place] for a scheduled trip. Dr. Schaefer said to go on the trip, sit in the sun and live her life.          Mary Vanegas, RN

## 2022-01-04 DIAGNOSIS — M85.852 OSTEOPENIA OF NECKS OF BOTH FEMURS: ICD-10-CM

## 2022-01-04 DIAGNOSIS — M85.851 OSTEOPENIA OF NECKS OF BOTH FEMURS: ICD-10-CM

## 2022-01-05 PROBLEM — R09.02 HYPOXIA: Status: RESOLVED | Noted: 2021-12-10 | Resolved: 2022-01-05

## 2022-01-05 PROBLEM — IMO0001 LUNG NODULE < 6CM ON CT: Status: ACTIVE | Noted: 2022-01-05

## 2022-01-05 PROBLEM — E04.1 THYROID NODULE: Status: ACTIVE | Noted: 2022-01-05

## 2022-01-05 PROBLEM — E87.6 HYPOKALEMIA: Status: RESOLVED | Noted: 2021-12-10 | Resolved: 2022-01-05

## 2022-01-05 RX ORDER — ALENDRONATE SODIUM 70 MG/1
TABLET ORAL
Qty: 12 TABLET | Refills: 4 | Status: SHIPPED | OUTPATIENT
Start: 2022-01-05 | End: 2023-03-06

## 2022-01-12 DIAGNOSIS — I10 BENIGN HYPERTENSION: ICD-10-CM

## 2022-01-12 RX ORDER — POTASSIUM CHLORIDE 750 MG/1
10 TABLET, FILM COATED, EXTENDED RELEASE ORAL DAILY
Qty: 90 TABLET | Refills: 4 | Status: SHIPPED | OUTPATIENT
Start: 2022-01-12 | End: 2022-06-30 | Stop reason: SDUPTHER

## 2022-01-12 NOTE — TELEPHONE ENCOUNTER
Caller: Jess Mackay       Relationship: SELF    Best call back number: [phone]     Requested Prescriptions:   Requested Prescriptions     Pending Prescriptions Disp Refills   • potassium chloride 10 MEQ CR tablet 90 tablet 4     Sig: Take 1 tablet by mouth Daily.        Pharmacy where request should be sent:   [facility] ST[id number]  [address]  [address]  [place] [address]  PHONE [phone]   FAX [phone]    Additional details provided by patient: PATIENT IS TRAVELING    Does the patient have less than a 3 day supply:  [x] Yes  [] No    Max Mesa Rep   01/12/22 12:36 EST

## 2022-01-13 DIAGNOSIS — I10 BENIGN HYPERTENSION: ICD-10-CM

## 2022-01-13 RX ORDER — HYDROCHLOROTHIAZIDE 25 MG/1
TABLET ORAL
Qty: 90 TABLET | Refills: 4 | Status: SHIPPED | OUTPATIENT
Start: 2022-01-13 | End: 2023-01-18

## 2022-02-01 ENCOUNTER — TELEPHONE (OUTPATIENT)
Dept: FAMILY MEDICINE CLINIC | Facility: CLINIC | Age: 72
End: 2022-02-01

## 2022-02-01 NOTE — TELEPHONE ENCOUNTER
Spoke with Virginia to see if we could schedule her thyroid U/S. She states they are still out of town  Will call when she gets back in town probably 2 months.

## 2022-02-14 ENCOUNTER — TELEPHONE (OUTPATIENT)
Dept: FAMILY MEDICINE CLINIC | Facility: CLINIC | Age: 72
End: 2022-02-14

## 2022-02-14 NOTE — TELEPHONE ENCOUNTER
Spoke with Virginia, she went in   12/20/2021  To get MRI abd. and did not stay to get it.       Discussed to reschedule and also get U/S of thyroid at that time. Virginia will be out of town until   9/2022  She will vic, about a week before she gets back in town and we will arrange these test.

## 2022-04-08 DIAGNOSIS — I10 BENIGN HYPERTENSION: ICD-10-CM

## 2022-04-08 RX ORDER — AMLODIPINE BESYLATE 10 MG/1
TABLET ORAL
Qty: 30 TABLET | Refills: 3 | Status: SHIPPED | OUTPATIENT
Start: 2022-04-08 | End: 2022-07-14

## 2022-06-22 ENCOUNTER — OFFICE VISIT (OUTPATIENT)
Dept: FAMILY MEDICINE CLINIC | Facility: CLINIC | Age: 72
End: 2022-06-22

## 2022-06-22 VITALS
SYSTOLIC BLOOD PRESSURE: 138 MMHG | BODY MASS INDEX: 29.2 KG/M2 | RESPIRATION RATE: 18 BRPM | TEMPERATURE: 97.1 F | OXYGEN SATURATION: 94 % | DIASTOLIC BLOOD PRESSURE: 70 MMHG | HEART RATE: 70 BPM | WEIGHT: 164.8 LBS | HEIGHT: 63 IN

## 2022-06-22 DIAGNOSIS — Z00.00 MEDICARE ANNUAL WELLNESS VISIT, SUBSEQUENT: Primary | ICD-10-CM

## 2022-06-22 DIAGNOSIS — M85.852 OSTEOPENIA OF LEFT HIP: ICD-10-CM

## 2022-06-22 DIAGNOSIS — J30.1 SEASONAL ALLERGIC RHINITIS DUE TO POLLEN: ICD-10-CM

## 2022-06-22 DIAGNOSIS — E66.3 OVERWEIGHT WITH BODY MASS INDEX (BMI) OF 29 TO 29.9 IN ADULT: ICD-10-CM

## 2022-06-22 DIAGNOSIS — E04.1 THYROID NODULE: ICD-10-CM

## 2022-06-22 DIAGNOSIS — Z12.4 CERVICAL CANCER SCREENING: ICD-10-CM

## 2022-06-22 DIAGNOSIS — Z13.220 SCREENING FOR HYPERLIPIDEMIA: ICD-10-CM

## 2022-06-22 DIAGNOSIS — Z12.11 COLON CANCER SCREENING: ICD-10-CM

## 2022-06-22 DIAGNOSIS — I25.10 ARTERIOSCLEROTIC CARDIOVASCULAR DISEASE (ASCVD): ICD-10-CM

## 2022-06-22 DIAGNOSIS — IMO0001 LUNG NODULE < 6CM ON CT: ICD-10-CM

## 2022-06-22 DIAGNOSIS — M20.32: ICD-10-CM

## 2022-06-22 DIAGNOSIS — Z12.31 ENCOUNTER FOR SCREENING MAMMOGRAM FOR MALIGNANT NEOPLASM OF BREAST: ICD-10-CM

## 2022-06-22 DIAGNOSIS — Q67.5 SCOLIOSIS, CONGENITAL: ICD-10-CM

## 2022-06-22 DIAGNOSIS — M51.16 LUMBAR DISC DISEASE WITH RADICULOPATHY: ICD-10-CM

## 2022-06-22 DIAGNOSIS — R93.5 ABNORMAL CT OF THE ABDOMEN: ICD-10-CM

## 2022-06-22 DIAGNOSIS — Z87.891 HISTORY OF TOBACCO USE: ICD-10-CM

## 2022-06-22 DIAGNOSIS — I10 BENIGN HYPERTENSION: ICD-10-CM

## 2022-06-22 DIAGNOSIS — M21.70 LEG LENGTH DISCREPANCY: ICD-10-CM

## 2022-06-22 LAB
BILIRUB BLD-MCNC: NEGATIVE MG/DL
CLARITY, POC: CLEAR
COLOR UR: YELLOW
GLUCOSE UR STRIP-MCNC: NEGATIVE MG/DL
KETONES UR QL: NEGATIVE
LEUKOCYTE EST, POC: NEGATIVE
NITRITE UR-MCNC: NEGATIVE MG/ML
PH UR: 7 [PH] (ref 5–8)
PROT UR STRIP-MCNC: NEGATIVE MG/DL
RBC # UR STRIP: NEGATIVE /UL
SP GR UR: 1.01 (ref 1–1.03)
UROBILINOGEN UR QL: NORMAL

## 2022-06-22 PROCEDURE — 36415 COLL VENOUS BLD VENIPUNCTURE: CPT | Performed by: FAMILY MEDICINE

## 2022-06-22 PROCEDURE — G0444 DEPRESSION SCREEN ANNUAL: HCPCS | Performed by: FAMILY MEDICINE

## 2022-06-22 PROCEDURE — 1170F FXNL STATUS ASSESSED: CPT | Performed by: FAMILY MEDICINE

## 2022-06-22 PROCEDURE — 1159F MED LIST DOCD IN RCRD: CPT | Performed by: FAMILY MEDICINE

## 2022-06-22 PROCEDURE — 81002 URINALYSIS NONAUTO W/O SCOPE: CPT | Performed by: FAMILY MEDICINE

## 2022-06-22 PROCEDURE — G0439 PPPS, SUBSEQ VISIT: HCPCS | Performed by: FAMILY MEDICINE

## 2022-06-22 PROCEDURE — 99214 OFFICE O/P EST MOD 30 MIN: CPT | Performed by: FAMILY MEDICINE

## 2022-06-23 DIAGNOSIS — E04.1 THYROID NODULE: Primary | ICD-10-CM

## 2022-06-23 LAB
ALBUMIN SERPL-MCNC: 4.3 G/DL (ref 3.7–4.7)
ALBUMIN/GLOB SERPL: 1.7 {RATIO} (ref 1.2–2.2)
ALP SERPL-CCNC: 57 IU/L (ref 44–121)
ALT SERPL-CCNC: 17 IU/L (ref 0–32)
AST SERPL-CCNC: 20 IU/L (ref 0–40)
BASOPHILS # BLD AUTO: 0.1 X10E3/UL (ref 0–0.2)
BASOPHILS NFR BLD AUTO: 1 %
BILIRUB SERPL-MCNC: 0.5 MG/DL (ref 0–1.2)
BUN SERPL-MCNC: 15 MG/DL (ref 8–27)
BUN/CREAT SERPL: 18 (ref 12–28)
CALCIUM SERPL-MCNC: 9.7 MG/DL (ref 8.7–10.3)
CHLORIDE SERPL-SCNC: 99 MMOL/L (ref 96–106)
CHOLEST SERPL-MCNC: 202 MG/DL (ref 100–199)
CHOLEST/HDLC SERPL: 3.1 RATIO (ref 0–4.4)
CO2 SERPL-SCNC: 24 MMOL/L (ref 20–29)
CREAT SERPL-MCNC: 0.84 MG/DL (ref 0.57–1)
EGFRCR SERPLBLD CKD-EPI 2021: 74 ML/MIN/1.73
EOSINOPHIL # BLD AUTO: 0.1 X10E3/UL (ref 0–0.4)
EOSINOPHIL NFR BLD AUTO: 2 %
ERYTHROCYTE [DISTWIDTH] IN BLOOD BY AUTOMATED COUNT: 12.7 % (ref 11.7–15.4)
GLOBULIN SER CALC-MCNC: 2.6 G/DL (ref 1.5–4.5)
GLUCOSE SERPL-MCNC: 96 MG/DL (ref 65–99)
HCT VFR BLD AUTO: 41.4 % (ref 34–46.6)
HDLC SERPL-MCNC: 66 MG/DL
HGB BLD-MCNC: 13.7 G/DL (ref 11.1–15.9)
IMM GRANULOCYTES # BLD AUTO: 0 X10E3/UL (ref 0–0.1)
IMM GRANULOCYTES NFR BLD AUTO: 0 %
LDLC SERPL CALC-MCNC: 121 MG/DL (ref 0–99)
LYMPHOCYTES # BLD AUTO: 1.3 X10E3/UL (ref 0.7–3.1)
LYMPHOCYTES NFR BLD AUTO: 19 %
MCH RBC QN AUTO: 29.3 PG (ref 26.6–33)
MCHC RBC AUTO-ENTMCNC: 33.1 G/DL (ref 31.5–35.7)
MCV RBC AUTO: 89 FL (ref 79–97)
MONOCYTES # BLD AUTO: 0.7 X10E3/UL (ref 0.1–0.9)
MONOCYTES NFR BLD AUTO: 9 %
NEUTROPHILS # BLD AUTO: 4.8 X10E3/UL (ref 1.4–7)
NEUTROPHILS NFR BLD AUTO: 69 %
PLATELET # BLD AUTO: 304 X10E3/UL (ref 150–450)
POTASSIUM SERPL-SCNC: 3.4 MMOL/L (ref 3.5–5.2)
PROT SERPL-MCNC: 6.9 G/DL (ref 6–8.5)
RBC # BLD AUTO: 4.67 X10E6/UL (ref 3.77–5.28)
SODIUM SERPL-SCNC: 141 MMOL/L (ref 134–144)
TRIGL SERPL-MCNC: 82 MG/DL (ref 0–149)
TSH SERPL DL<=0.005 MIU/L-ACNC: 1.24 UIU/ML (ref 0.45–4.5)
VLDLC SERPL CALC-MCNC: 15 MG/DL (ref 5–40)
WBC # BLD AUTO: 7 X10E3/UL (ref 3.4–10.8)

## 2022-06-24 DIAGNOSIS — Q45.3 PANCREATIC ABNORMALITY: Primary | ICD-10-CM

## 2022-06-30 ENCOUNTER — TELEPHONE (OUTPATIENT)
Dept: FAMILY MEDICINE CLINIC | Facility: CLINIC | Age: 72
End: 2022-06-30

## 2022-06-30 DIAGNOSIS — I10 BENIGN HYPERTENSION: ICD-10-CM

## 2022-06-30 RX ORDER — POTASSIUM CHLORIDE 1500 MG/1
20 TABLET, FILM COATED, EXTENDED RELEASE ORAL DAILY
Qty: 90 TABLET | Refills: 3 | Status: SHIPPED | OUTPATIENT
Start: 2022-06-30

## 2022-06-30 NOTE — TELEPHONE ENCOUNTER
Caller: Jess Mackay    Relationship: Self    Best call back number:[phone]    What medications are you currently taking:   Current Outpatient Medications on File Prior to Visit   Medication Sig Dispense Refill   • albuterol sulfate  (90 Base) MCG/ACT inhaler Inhale 2 puffs Every 4 (Four) Hours. 8.5 g 0   • alendronate (FOSAMAX) 70 MG tablet TAKE 1 TABLET BY MOUTH EVERY 7 DAYS. TAKE WITH 8 OUNCES OF WATER 30 MINUTES BEFORE FIRST FOOD, DRINK, OR MEDICATION. AVOID LYING DOWN FOR 30 MINUTES 12 tablet 4   • amLODIPine (NORVASC) 10 MG tablet TAKE 1 TABLET BY MOUTH EVERY DAY 30 tablet 3   • aspirin 325 MG tablet Take 325 mg by mouth Daily.     • Calcium 600-200 MG-UNIT per tablet Take 1 tablet by mouth Daily.     • fexofenadine (ALLEGRA) 180 MG tablet Take 180 mg by mouth Daily.     • hydroCHLOROthiazide (HYDRODIURIL) 25 MG tablet TAKE 1 TABLET BY MOUTH EVERY DAY 90 tablet 4   • Multiple Vitamins-Minerals (ZINC PO) Take 1 tablet by mouth Daily.     • potassium chloride 10 MEQ CR tablet Take 1 tablet by mouth Daily. 90 tablet 4   • vitamin D (ERGOCALCIFEROL) 1.25 MG (48516 UT) capsule capsule Take 50,000 Units by mouth 1 (One) Time Per Week.       No current facility-administered medications on file prior to visit.          When did you start taking these medications:     Which medication are you concerned about: POTASSIUM CHLORIDE 10MEQ    Who prescribed you this medication: DR ALLEN    What are your concerns: PATIENT IS CALLING IN STATING THAT DR ALLEN IS TO INCREASE THIS TO 20MEQ.  SHE HAS BEEN TAKING TWO OF THE 10MEQ AT A TIME CAUSING HER TO RUN OUT.  SHE WILL NEED A NEW PRESCRIPTION WRITTEN FOR THE 20MEQ AND SENT TO THE PHARMACY. SHE USES THE [facility]  [address] [place] [address]  [phone]    How long have you had these concerns:

## 2022-07-05 ENCOUNTER — HOSPITAL ENCOUNTER (OUTPATIENT)
Dept: CT IMAGING | Facility: HOSPITAL | Age: 72
Discharge: HOME | End: 2022-07-05

## 2022-07-05 ENCOUNTER — TELEPHONE (OUTPATIENT)
Dept: FAMILY MEDICINE CLINIC | Facility: CLINIC | Age: 72
End: 2022-07-05

## 2022-07-05 ENCOUNTER — HOSPITAL ENCOUNTER (OUTPATIENT)
Dept: ULTRASOUND IMAGING | Facility: HOSPITAL | Age: 72
Discharge: HOME | End: 2022-07-05

## 2022-07-05 ENCOUNTER — HOSPITAL ENCOUNTER (OUTPATIENT)
Dept: MAMMOGRAPHY | Facility: HOSPITAL | Age: 72
Discharge: HOME | End: 2022-07-05

## 2022-07-05 DIAGNOSIS — Z12.31 ENCOUNTER FOR SCREENING MAMMOGRAM FOR MALIGNANT NEOPLASM OF BREAST: ICD-10-CM

## 2022-07-05 DIAGNOSIS — Q45.3 PANCREATIC ABNORMALITY: ICD-10-CM

## 2022-07-05 DIAGNOSIS — E04.1 THYROID NODULE: ICD-10-CM

## 2022-07-05 DIAGNOSIS — Z87.891 HISTORY OF TOBACCO USE: ICD-10-CM

## 2022-07-05 PROCEDURE — 77067 SCR MAMMO BI INCL CAD: CPT

## 2022-07-05 PROCEDURE — 71271 CT THORAX LUNG CANCER SCR C-: CPT

## 2022-07-05 PROCEDURE — 0 IOPAMIDOL PER 1 ML: Performed by: FAMILY MEDICINE

## 2022-07-05 PROCEDURE — 77063 BREAST TOMOSYNTHESIS BI: CPT

## 2022-07-05 PROCEDURE — 74170 CT ABD WO CNTRST FLWD CNTRST: CPT

## 2022-07-05 PROCEDURE — 76536 US EXAM OF HEAD AND NECK: CPT

## 2022-07-05 RX ADMIN — IOPAMIDOL 100 ML: 755 INJECTION, SOLUTION INTRAVENOUS at 10:00

## 2022-07-05 NOTE — TELEPHONE ENCOUNTER
Spoke with Virginia, per Dr Schaefer  Thyroid U/S shows nodule, it is probably ok , but radiologist recommend a biopsy, so we should arrange a referral with ENT to see what their opinion is.  Virginia agrees to referral.      Appointment was arranged with Dr MORFIN for 7/11/2022 arrival at 9;40AM [address] [facility]  [place]

## 2022-07-05 NOTE — TELEPHONE ENCOUNTER
Spoke with Virginia, per Dr Schaefer, ct of chest and abdominal  shows no evidence of malignancy. There is moderate emphysema, so it was good that you stopped smoking. Scan does show calificiations of heart. Discussed with Virginia she could benefit from a calcium and vascular score screening. Gave her information and number to call and schedule.

## 2022-07-06 PROBLEM — Z86.16 HISTORY OF COVID-19: Status: ACTIVE | Noted: 2021-12-06

## 2022-07-06 NOTE — ASSESSMENT & PLAN NOTE
Patient's (Body mass index is 29.19 kg/m².) indicates that they are overweight with health conditions that include hypertension and dyslipidemias . Weight is improving with treatment. BMI is is above average; BMI management plan is completed. We discussed portion control and increasing exercise.

## 2022-07-07 ENCOUNTER — TRANSCRIBE ORDERS (OUTPATIENT)
Dept: ADMINISTRATIVE | Facility: HOSPITAL | Age: 72
End: 2022-07-07

## 2022-07-07 DIAGNOSIS — Z13.9 SCREENING DUE: Primary | ICD-10-CM

## 2022-07-14 DIAGNOSIS — I10 BENIGN HYPERTENSION: ICD-10-CM

## 2022-07-14 RX ORDER — AMLODIPINE BESYLATE 10 MG/1
TABLET ORAL
Qty: 90 TABLET | Refills: 3 | Status: SHIPPED | OUTPATIENT
Start: 2022-07-14

## 2022-07-19 ENCOUNTER — TELEPHONE (OUTPATIENT)
Dept: FAMILY MEDICINE CLINIC | Facility: CLINIC | Age: 72
End: 2022-07-19

## 2022-07-19 NOTE — TELEPHONE ENCOUNTER
Spoke with Julianna regarding cologuard. She stated that she would try to complete as soon as possible.

## 2022-08-08 ENCOUNTER — TELEPHONE (OUTPATIENT)
Dept: FAMILY MEDICINE CLINIC | Facility: CLINIC | Age: 72
End: 2022-08-08

## 2022-08-08 DIAGNOSIS — R35.0 URINARY FREQUENCY: Primary | ICD-10-CM

## 2022-08-08 RX ORDER — NITROFURANTOIN 25; 75 MG/1; MG/1
100 CAPSULE ORAL 2 TIMES DAILY
Qty: 20 CAPSULE | Refills: 0 | Status: SHIPPED | OUTPATIENT
Start: 2022-08-08

## 2022-08-08 NOTE — TELEPHONE ENCOUNTER
Spoke with [place], out of town got up this am with frequency, requested medication.    Per Dr Olive martinez was sent to pharmacy, call if symptoms do not  Improve..

## 2022-08-31 ENCOUNTER — TELEPHONE (OUTPATIENT)
Dept: FAMILY MEDICINE CLINIC | Facility: CLINIC | Age: 72
End: 2022-08-31

## 2022-08-31 NOTE — TELEPHONE ENCOUNTER
Spoke with Virginia. She stated that she had not done the cologuard yet. Gave her information on nearest [employer] store to her which was in [place].

## 2022-10-04 ENCOUNTER — TELEPHONE (OUTPATIENT)
Dept: FAMILY MEDICINE CLINIC | Facility: CLINIC | Age: 72
End: 2022-10-04

## 2022-10-07 ENCOUNTER — APPOINTMENT (OUTPATIENT)
Dept: CT IMAGING | Facility: HOSPITAL | Age: 72
End: 2022-10-07

## 2022-10-07 ENCOUNTER — APPOINTMENT (OUTPATIENT)
Dept: CARDIOLOGY | Facility: HOSPITAL | Age: 72
End: 2022-10-07

## 2023-01-18 DIAGNOSIS — I10 BENIGN HYPERTENSION: ICD-10-CM

## 2023-01-18 RX ORDER — HYDROCHLOROTHIAZIDE 25 MG/1
TABLET ORAL
Qty: 90 TABLET | Refills: 1 | Status: SHIPPED | OUTPATIENT
Start: 2023-01-18

## 2023-03-05 DIAGNOSIS — M85.852 OSTEOPENIA OF NECKS OF BOTH FEMURS: ICD-10-CM

## 2023-03-05 DIAGNOSIS — M85.851 OSTEOPENIA OF NECKS OF BOTH FEMURS: ICD-10-CM

## 2023-03-06 RX ORDER — ALENDRONATE SODIUM 70 MG/1
TABLET ORAL
Qty: 12 TABLET | Refills: 4 | Status: SHIPPED | OUTPATIENT
Start: 2023-03-06

## 2023-05-11 DIAGNOSIS — I10 BENIGN HYPERTENSION: ICD-10-CM

## 2023-05-11 NOTE — TELEPHONE ENCOUNTER
"Caller: Jess Mackay \"Julianna\"    Relationship: Self    Best call back number: [phone]    Requested Prescriptions:   Requested Prescriptions     Pending Prescriptions Disp Refills   • potassium chloride ER (K-TAB) 20 MEQ tablet controlled-release ER tablet 90 tablet 3     Sig: Take 1 tablet by mouth Daily.        Pharmacy where request should be sent: [facility]ARMACY #[id number] - [address] [phone] [facility] [phone]      Last office visit with prescribing clinician: 6/22/2022   Last telemedicine visit with prescribing clinician: 3/5/2023   Next office visit with prescribing clinician: Visit date not found     Additional details provided by patient: PATIENT HAS 5 PILLS LEFT     Does the patient have less than a 3 day supply:  [x] Yes  [] No    Would you like a call back once the refill request has been completed: [x] Yes [] No    If the office needs to give you a call back, can they leave a voicemail: [x] Yes [] No    Max Flores Rep   05/11/23 14:02 EDT           "

## 2023-05-12 RX ORDER — POTASSIUM CHLORIDE 1500 MG/1
20 TABLET, FILM COATED, EXTENDED RELEASE ORAL DAILY
Qty: 90 TABLET | Refills: 3 | Status: SHIPPED | OUTPATIENT
Start: 2023-05-12

## 2023-06-26 PROBLEM — J12.82 PNEUMONIA DUE TO COVID-19 VIRUS: Status: RESOLVED | Noted: 2021-12-08 | Resolved: 2023-06-26

## 2023-06-26 PROBLEM — U07.1 PNEUMONIA DUE TO COVID-19 VIRUS: Status: RESOLVED | Noted: 2021-12-08 | Resolved: 2023-06-26

## 2023-06-26 PROBLEM — D89.831 CYTOKINE RELEASE SYNDROME, GRADE 1: Status: RESOLVED | Noted: 2021-12-11 | Resolved: 2023-06-26

## 2023-09-13 ENCOUNTER — TELEPHONE (OUTPATIENT)
Dept: FAMILY MEDICINE CLINIC | Facility: CLINIC | Age: 73
End: 2023-09-13
Payer: MEDICARE

## 2023-09-13 NOTE — TELEPHONE ENCOUNTER
"  Caller: Jess Mackay \"Julianna\"    Relationship: Self    Best call back number: [phone]     Who are you requesting to speak with (clinical staff, provider,  specific staff member): CLINICAL STAFF     What was the call regarding: PATIENT IS STILL HAVING HER RIGHT HAND SHAKING AND  WANTS TO KNOW WHAT NEXT STEPS ARE WHERE SHE CAN BE REFERRED TO HAVE IT LOOKED AT     PLEASE CALL AND ADVISE    "

## 2023-09-13 NOTE — TELEPHONE ENCOUNTER
Patient has been called and let her know from when she was last here PCP said that she felt her tremor was less and not that bad. She wanted me to let her know that this was not abnormal to have a slight one but if this was to become significantly worse then we would need to look into more issues.,  She said that one thing we could do is start her on a bata blocker medication known as Metoprolol XL  but she would have to watch her pressures and monitor her heart rate as well.   She said that with her being on two other blood pressure medications already she would like to think about this first before starting this.   She said that will let us know if this is something she wants for us to send in for her

## 2023-10-20 ENCOUNTER — OFFICE VISIT (OUTPATIENT)
Dept: FAMILY MEDICINE CLINIC | Facility: CLINIC | Age: 73
End: 2023-10-20
Payer: MEDICARE

## 2023-10-20 VITALS
RESPIRATION RATE: 18 BRPM | SYSTOLIC BLOOD PRESSURE: 128 MMHG | BODY MASS INDEX: 27.36 KG/M2 | TEMPERATURE: 97.5 F | HEART RATE: 80 BPM | OXYGEN SATURATION: 95 % | HEIGHT: 63 IN | WEIGHT: 154.4 LBS | DIASTOLIC BLOOD PRESSURE: 72 MMHG

## 2023-10-20 DIAGNOSIS — E66.3 OVERWEIGHT WITH BODY MASS INDEX (BMI) OF 29 TO 29.9 IN ADULT: ICD-10-CM

## 2023-10-20 DIAGNOSIS — H10.13 ALLERGIC CONJUNCTIVITIS OF BOTH EYES: Primary | ICD-10-CM

## 2023-10-20 DIAGNOSIS — I10 BENIGN HYPERTENSION: ICD-10-CM

## 2023-10-20 PROCEDURE — 3074F SYST BP LT 130 MM HG: CPT | Performed by: FAMILY MEDICINE

## 2023-10-20 PROCEDURE — 99213 OFFICE O/P EST LOW 20 MIN: CPT | Performed by: FAMILY MEDICINE

## 2023-10-20 PROCEDURE — 3078F DIAST BP <80 MM HG: CPT | Performed by: FAMILY MEDICINE

## 2023-10-20 RX ORDER — KETOROLAC TROMETHAMINE 5 MG/ML
1 SOLUTION OPHTHALMIC 4 TIMES DAILY
Qty: 10 ML | Refills: 6 | Status: SHIPPED | OUTPATIENT
Start: 2023-10-20

## 2023-10-20 NOTE — PROGRESS NOTES
"Chief Complaint  Dry Eye and Eye Pain    Subjective     CC  Problem List  Visit Diagnosis   Encounters  Notes  Medications  Labs  Result Review Imaging  Media    Jess Mackay presents to [facility] for   History of Present Illness  Julianna is here today for red eyes. They have been red for 4 or 5 days and are not getting any better. She reports moderate itching. No visual changes. She has moderate allergies and reports a lot of nasal congestion.   Hypertension  This is a chronic problem. The problem is unchanged. The problem is controlled. Pertinent negatives include no chest pain, orthopnea, palpitations, peripheral edema, PND or shortness of breath. Current antihypertension treatment includes calcium channel blockers and diuretics. The current treatment provides moderate improvement. There are no compliance problems.        Review of Systems   Constitutional:  Negative for fever and unexpected weight loss.   HENT:  Positive for congestion and rhinorrhea.    Eyes:  Positive for redness and itching. Negative for visual disturbance.   Respiratory:  Negative for cough and shortness of breath.    Cardiovascular:  Negative for chest pain, palpitations, orthopnea, leg swelling and PND.   Skin:  Negative for wound.        Objective   Vital Signs:   /72 (BP Location: Left arm, Patient Position: Sitting, Cuff Size: Adult)   Pulse 80   Temp 97.5 °F (36.4 °C) (Infrared)   Resp 18   Ht 160 cm (63\")   Wt 70 kg (154 lb 6.4 oz)   SpO2 95% Comment: room air  BMI 27.35 kg/m²     Physical Exam  Constitutional:       General: She is not in acute distress.  HENT:      Right Ear: Tympanic membrane normal.      Left Ear: Tympanic membrane normal.      Mouth/Throat:      Pharynx: No oropharyngeal exudate or posterior oropharyngeal erythema.   Eyes:      Extraocular Movements: Extraocular movements intact.      Pupils: Pupils are equal, round, and reactive to light.      " Comments: Conjunctivae injected bilaterally   Cardiovascular:      Rate and Rhythm: Normal rate and regular rhythm.      Heart sounds: No murmur heard.  Skin:     Findings: No rash.   Neurological:      Mental Status: She is alert.        Result Review :Labs  Result Review  Imaging  Med Tab  Media                 Assessment and Plan CC Problem List  Visit Diagnosis  ROS  Review (Popup)  Health Maintenance  Quality  BestPractice  Medications  SmartSets  SnapShot Encounters  Media  Problem List Items Addressed This Visit          High    Benign hypertension    Overview     Controlled compliant.         Current Assessment & Plan     Hypertension is improving with treatment.  Continue current treatment regimen.  Blood pressure will be reassessed in 3 months.            Unprioritized    Overweight with body mass index (BMI) of 29 to 29.9 in adult    Overview     Healthy diet and regular exercise encouraged.          Current Assessment & Plan     Patient's (Body mass index is 27.35 kg/m².) indicates that they are overweight with health conditions that include hypertension . Weight is improving with lifestyle modifications. BMI is is above average; BMI management plan is completed. We discussed portion control and increasing exercise.           Other Visit Diagnoses       Allergic conjunctivitis of both eyes    -  Primary    Relevant Medications    ketorolac (Acular) 0.5 % ophthalmic solution            Follow Up Instructions Charge Capture  Follow-up Communications  Return if symptoms worsen or fail to improve.  Patient was given instructions and counseling regarding her condition or for health maintenance advice. Please see specific information pulled into the AVS if appropriate.

## 2023-10-20 NOTE — ASSESSMENT & PLAN NOTE
Patient's (Body mass index is 27.35 kg/m².) indicates that they are overweight with health conditions that include hypertension . Weight is improving with lifestyle modifications. BMI is is above average; BMI management plan is completed. We discussed portion control and increasing exercise.

## 2024-04-22 DIAGNOSIS — M85.852 OSTEOPENIA OF NECKS OF BOTH FEMURS: ICD-10-CM

## 2024-04-22 DIAGNOSIS — M85.851 OSTEOPENIA OF NECKS OF BOTH FEMURS: ICD-10-CM

## 2024-04-22 RX ORDER — ALENDRONATE SODIUM 70 MG/1
TABLET ORAL
Qty: 12 TABLET | Refills: 1 | Status: SHIPPED | OUTPATIENT
Start: 2024-04-22

## 2024-05-06 NOTE — PROGRESS NOTES
"Chief Complaint  Hypertension    Subjective     CC  Problem List  Visit Diagnosis   Encounters  Notes  Medications  Labs  Result Review Imaging  Media    Jess Mackay presents to [facility] for   Hypertension  This is a chronic problem. The current episode started more than 1 year ago. The problem has been stable since onset. The problem is controlled. Associated symptoms include headaches and malaise/fatigue. Pertinent negatives include no blurred vision, chest pain, palpitations or shortness of breath. There are no known risk factors for coronary artery disease. Past treatments include nothing. Current antihypertension treatment includes calcium channel blockers and diuretics. The current treatment provides moderate improvement. There are no compliance problems.        Review of Systems   Constitutional:  Positive for malaise/fatigue.   Eyes:  Negative for blurred vision.   Respiratory:  Negative for shortness of breath.    Cardiovascular:  Negative for chest pain, palpitations and leg swelling.   Gastrointestinal:  Negative for abdominal pain, constipation, diarrhea, nausea and vomiting.   Endocrine: Negative for cold intolerance, heat intolerance, polydipsia and polyuria.   Genitourinary:  Negative for dysuria.   Hematological:  Negative for adenopathy. Does not bruise/bleed easily.        Objective   Vital Signs:   /80 (BP Location: Right arm, Patient Position: Sitting, Cuff Size: Adult)   Pulse 85   Temp 98.5 °F (36.9 °C) (Temporal)   Resp 18   Ht 161.3 cm (63.5\")   Wt 74.4 kg (164 lb)   SpO2 97% Comment: room air  BMI 28.60 kg/m²     Physical Exam  Constitutional:       General: She is not in acute distress.  Neck:      Thyroid: No thyromegaly.      Vascular: No JVD.   Cardiovascular:      Rate and Rhythm: Normal rate and regular rhythm.      Heart sounds: No murmur heard.  Pulmonary:      Effort: Pulmonary effort is normal.      Breath sounds: " Normal breath sounds.   Musculoskeletal:      Cervical back: Neck supple.      Right lower leg: No edema.      Left lower leg: No edema.   Lymphadenopathy:      Cervical: No cervical adenopathy.   Skin:     Findings: No rash.   Neurological:      Mental Status: She is alert.        Result Review :Labs  Result Review  Imaging  Med Tab  Media                 Assessment and Plan CC Problem List  Visit Diagnosis  ROS  Review (Popup)  Health Maintenance  Quality  BestPractice  Medications  SmartSets  SnapShot Encounters  Media  Problem List Items Addressed This Visit          High    Benign hypertension - Primary    Overview     Controlled compliant after adjustment of meds.          Current Assessment & Plan     Hypertension is stable and controlled  Continue current treatment regimen.  Blood pressure will be reassessed in 6 months.         Relevant Medications    potassium chloride ER (K-TAB) 20 MEQ tablet controlled-release ER tablet    Other Relevant Orders    Comprehensive Metabolic Panel (Completed)    Prediabetes    Overview     A1c 5.9 05/08/2024  The importance of maintaining lower weights and regular exercise discussed and understood, in terms of potentially preventing DM.            Low    History of tobacco use    Overview     1 ppd x 35 yrs.   Low dose CT ordered. 11/30/2020 nodule repeat at 6 mos intervals  stable 06/22 ,negative 06/26/2023            Unprioritized    Overweight (BMI 25.0-29.9)    Overview     Healthy diet and regular exercise encouraged.          Current Assessment & Plan     Patient's (Body mass index is 28.6 kg/m².) indicates that they are overweight with health conditions that include hypertension . Weight is unchanged. BMI is is above average; BMI management plan is completed. We discussed portion control and increasing exercise.           Other Visit Diagnoses       Hyperglycemia        a1c 5.9    Relevant Orders    Hemoglobin A1c (Completed)            Follow Up  Instructions Charge Capture  Follow-up Communications  Return in about 3 months (around 8/8/2024) for Medicare Wellness.  Patient was given instructions and counseling regarding her condition or for health maintenance advice. Please see specific information pulled into the AVS if appropriate.

## 2024-05-08 ENCOUNTER — OFFICE VISIT (OUTPATIENT)
Dept: FAMILY MEDICINE CLINIC | Facility: CLINIC | Age: 74
End: 2024-05-08
Payer: MEDICARE

## 2024-05-08 VITALS
SYSTOLIC BLOOD PRESSURE: 132 MMHG | OXYGEN SATURATION: 97 % | DIASTOLIC BLOOD PRESSURE: 80 MMHG | TEMPERATURE: 98.5 F | BODY MASS INDEX: 28 KG/M2 | HEIGHT: 64 IN | RESPIRATION RATE: 18 BRPM | WEIGHT: 164 LBS | HEART RATE: 85 BPM

## 2024-05-08 DIAGNOSIS — Z87.891 HISTORY OF TOBACCO USE: ICD-10-CM

## 2024-05-08 DIAGNOSIS — E66.3 OVERWEIGHT (BMI 25.0-29.9): ICD-10-CM

## 2024-05-08 DIAGNOSIS — R73.9 HYPERGLYCEMIA: ICD-10-CM

## 2024-05-08 DIAGNOSIS — I10 BENIGN HYPERTENSION: Primary | ICD-10-CM

## 2024-05-08 DIAGNOSIS — R73.03 PREDIABETES: ICD-10-CM

## 2024-05-08 PROCEDURE — 3075F SYST BP GE 130 - 139MM HG: CPT | Performed by: FAMILY MEDICINE

## 2024-05-08 PROCEDURE — 1160F RVW MEDS BY RX/DR IN RCRD: CPT | Performed by: FAMILY MEDICINE

## 2024-05-08 PROCEDURE — 99214 OFFICE O/P EST MOD 30 MIN: CPT | Performed by: FAMILY MEDICINE

## 2024-05-08 PROCEDURE — 3079F DIAST BP 80-89 MM HG: CPT | Performed by: FAMILY MEDICINE

## 2024-05-08 PROCEDURE — 1126F AMNT PAIN NOTED NONE PRSNT: CPT | Performed by: FAMILY MEDICINE

## 2024-05-08 PROCEDURE — G2211 COMPLEX E/M VISIT ADD ON: HCPCS | Performed by: FAMILY MEDICINE

## 2024-05-08 PROCEDURE — 1159F MED LIST DOCD IN RCRD: CPT | Performed by: FAMILY MEDICINE

## 2024-05-08 RX ORDER — POTASSIUM CHLORIDE 1500 MG/1
20 TABLET, EXTENDED RELEASE ORAL DAILY
Qty: 90 TABLET | Refills: 3 | Status: SHIPPED | OUTPATIENT
Start: 2024-05-08

## 2024-05-08 NOTE — ASSESSMENT & PLAN NOTE
Patient's (Body mass index is 28.6 kg/m².) indicates that they are overweight with health conditions that include hypertension . Weight is unchanged. BMI is is above average; BMI management plan is completed. We discussed portion control and increasing exercise.

## 2024-05-09 LAB
ALBUMIN SERPL-MCNC: 4.4 G/DL (ref 3.8–4.8)
ALBUMIN/GLOB SERPL: 1.8 {RATIO} (ref 1.2–2.2)
ALP SERPL-CCNC: 52 IU/L (ref 44–121)
ALT SERPL-CCNC: 15 IU/L (ref 0–32)
AST SERPL-CCNC: 20 IU/L (ref 0–40)
BILIRUB SERPL-MCNC: 0.4 MG/DL (ref 0–1.2)
BUN SERPL-MCNC: 16 MG/DL (ref 8–27)
BUN/CREAT SERPL: 18 (ref 12–28)
CALCIUM SERPL-MCNC: 10.1 MG/DL (ref 8.7–10.3)
CHLORIDE SERPL-SCNC: 99 MMOL/L (ref 96–106)
CO2 SERPL-SCNC: 25 MMOL/L (ref 20–29)
CREAT SERPL-MCNC: 0.89 MG/DL (ref 0.57–1)
EGFRCR SERPLBLD CKD-EPI 2021: 68 ML/MIN/1.73
GLOBULIN SER CALC-MCNC: 2.4 G/DL (ref 1.5–4.5)
GLUCOSE SERPL-MCNC: 92 MG/DL (ref 70–99)
HBA1C MFR BLD: 5.9 % (ref 4.8–5.6)
POTASSIUM SERPL-SCNC: 3.5 MMOL/L (ref 3.5–5.2)
PROT SERPL-MCNC: 6.8 G/DL (ref 6–8.5)
SODIUM SERPL-SCNC: 140 MMOL/L (ref 134–144)

## 2024-05-09 NOTE — PROGRESS NOTES
FANCRU message was sent   Good morning we have your labs back and per Dr. Schaefer   You have normal liver, kidney and thyroid function, your electrolyte function is normal as well. Your glucose was 92 and your A1c (average blood sugar over 3 months) was 5.9. be sure to watch and follow a low concentrated sweets and low calorie diet. It would be best to have this checked again in 6 months as we want to make sure that it has not increased more.

## 2024-05-24 PROBLEM — R73.03 PREDIABETES: Status: ACTIVE | Noted: 2024-05-24

## 2024-06-24 NOTE — PROGRESS NOTES
Chief Complaint  Medicare Wellness-subsequent, Hypertension, and Blood Sugar Problem    Subjective     CC  Problem List  Visit Diagnosis   Encounters  Notes  Medications  Labs  Result Review Imaging  Media    Jess Mackay presents to [facility] for   Hypertension  This is a chronic problem. The current episode started more than 1 year ago. The problem has been stable since onset. The problem is controlled. Pertinent negatives include no blurred vision, chest pain, headaches, malaise/fatigue, palpitations, PND or shortness of breath. There are no associated agents to hypertension. Risk factors for coronary artery disease include post-menopausal state, smoking/tobacco exposure and family history. Past treatments include nothing. Current antihypertension treatment includes calcium channel blockers and diuretics. The current treatment provides moderate improvement. There are no compliance problems.    Blood Sugar Problem  This is a new (5/2024- A1C 5.9) problem. The current episode started more than 1 month ago. The problem occurs constantly. The problem has been unchanged. Associated symptoms include arthralgias (multiple left foot and let worse). Pertinent negatives include no abdominal pain, change in bowel habit, chest pain, chills, congestion, coughing, fatigue, fever, headaches, joint swelling, myalgias, nausea, numbness, rash, sore throat, swollen glands, visual change, vomiting or weakness. The symptoms are aggravated by eating and drinking. She has tried nothing for the symptoms.       Review of Systems   Constitutional:  Negative for activity change, appetite change, chills, fatigue, fever, malaise/fatigue, unexpected weight gain and unexpected weight loss.   HENT:  Negative for congestion, ear pain, hearing loss, rhinorrhea, sinus pressure, sore throat, swollen glands, trouble swallowing and voice change.    Eyes:  Negative for blurred vision and visual  "disturbance.   Respiratory:  Negative for apnea, cough, shortness of breath and wheezing.    Cardiovascular:  Negative for chest pain, palpitations and PND.   Gastrointestinal:  Negative for abdominal pain, blood in stool, change in bowel habit, constipation, diarrhea, nausea, vomiting and indigestion.   Endocrine: Negative for cold intolerance, heat intolerance, polydipsia and polyuria.   Genitourinary:  Negative for breast discharge, breast lump, breast pain, dysuria, flank pain, frequency, pelvic pain and vaginal bleeding.   Musculoskeletal:  Positive for arthralgias (multiple left foot and let worse). Negative for joint swelling and myalgias.   Skin:  Negative for rash, skin lesions and wound.   Allergic/Immunologic: Negative for environmental allergies and immunocompromised state.   Neurological:  Negative for dizziness, syncope, weakness, numbness, headache and memory problem.   Hematological:  Negative for adenopathy. Does not bruise/bleed easily.   Psychiatric/Behavioral:  Negative for suicidal ideas and depressed mood. The patient is not nervous/anxious.         Objective   Vital Signs:   /68 (BP Location: Left arm, Patient Position: Sitting, Cuff Size: Adult)   Pulse 69   Temp 98.4 °F (36.9 °C) (Temporal)   Resp 18   Ht 161.3 cm (63.5\")   Wt 73.1 kg (161 lb 4 oz)   SpO2 94% Comment: room air  BMI 28.12 kg/m²     Physical Exam  Constitutional:       General: She is not in acute distress.  HENT:      Right Ear: Tympanic membrane and external ear normal. There is impacted cerumen.      Left Ear: Tympanic membrane and external ear normal. There is impacted cerumen (irrigated until clear bilaterally).      Nose: No congestion or rhinorrhea.      Mouth/Throat:      Pharynx: No oropharyngeal exudate or posterior oropharyngeal erythema.   Eyes:      Pupils: Pupils are equal, round, and reactive to light.      Comments: Fundi benign   Cardiovascular:      Rate and Rhythm: Normal rate and regular " rhythm.      Heart sounds: No murmur heard.  Pulmonary:      Effort: Pulmonary effort is normal.      Breath sounds: Normal breath sounds. No wheezing.   Chest:   Breasts:     Right: No swelling, inverted nipple, mass, nipple discharge or skin change.      Left: No swelling, inverted nipple, mass, nipple discharge or skin change.   Abdominal:      General: Abdomen is flat. Bowel sounds are normal.      Palpations: Abdomen is soft. There is no mass.      Tenderness: There is no abdominal tenderness. There is no right CVA tenderness or left CVA tenderness.      Hernia: No hernia is present.   Musculoskeletal:         General: Deformity (knees and left foot back severe deformity Foreshortening of LLE) present.      Cervical back: Neck supple. No tenderness.      Right lower leg: No edema.      Left lower leg: No edema.   Lymphadenopathy:      Cervical: No cervical adenopathy.      Upper Body:      Right upper body: No axillary adenopathy.      Left upper body: No axillary adenopathy.   Skin:     Findings: No bruising or rash.   Neurological:      Mental Status: She is alert.      Sensory: No sensory deficit.      Motor: Weakness (generalized lower ext) present.      Coordination: Coordination abnormal.      Gait: Gait abnormal (wide based).   Psychiatric:         Mood and Affect: Mood normal.         Behavior: Behavior normal.         Thought Content: Thought content normal.        Result Review :Labs  Result Review  Imaging  Med Tab  Media          Ear Cerumen Removal    Date/Time: 6/26/2024 4:14 PM    Performed by: Ivette Schaefer MD  Authorized by: Ivette Schaefer MD    Anesthesia:  Local Anesthetic: none  Ceruminolytics applied: Ceruminolytics applied prior to the procedure.  Location details: left ear and right ear  Patient tolerance: patient tolerated the procedure well with no immediate complications  Procedure type: irrigation   Sedation:  Patient sedated: no              Assessment and Plan CC Problem  List  Visit Diagnosis  ROS  Review (Popup)  [facility] Maintenance  Quality  BestPractice  Medications  SmartSets  SnapShot Encounters  Media  Problem List Items Addressed This Visit          High    Benign hypertension    Overview     Controlled compliant after adjustment of meds.          Current Assessment & Plan     Hypertension is stable and controlled  Continue current treatment regimen.  Blood pressure will be reassessed in 3 months.         Relevant Orders    CBC & Differential (Completed)    Comprehensive Metabolic Panel (Completed)    Prediabetes    Overview     A1c 5.9 05/08/2024  The importance of maintaining lower weights and regular exercise discussed and understood, in terms of potentially preventing DM.         Panlobular emphysema    Overview     Moderate  on CT 12/2023  No sxs on no meds.             Medium    Leg length discrepancy    Overview     LLE shorter than right by 6 inches. Stable elevated show, acquired scoliosis severe.         Lumbar disc disease with radiculopathy    Overview     Severe with moderate lower lumbar curve with moderate degenerative changes. Pt to capitalize on muscular compensation. Sxs are fairly stable with good back mechanics. She ambulates with a cane            Low    Allergic rhinitis due to pollen    Overview     Stable on prn meds which are continued         History of tobacco use    Overview     1 ppd x 35 yrs.   Low dose CT ordered. 11/30/2020 nodule repeat at 6 mos intervals  stable 06/22 ,negative 06/26/2023, repeat ordered         Relevant Orders     CT Chest Low Dose Cancer Screening WO    Scoliosis, congenital    Overview     Severe and worsening with age. To date she is doing fairly well.          Varus deformity of great toe, left    Overview     Severe hx of surgery with little improvement         Osteopenia of neck of femur       Unprioritized    Encounter for screening mammogram for malignant neoplasm of breast    Overview     Last mammogram  06/22/2022 11/06/2018 Fibroglandular density         Relevant Orders    Mammo Screening Digital Tomosynthesis Bilateral With CAD    Colon cancer screening    Overview     Patient declined colonoscopy or cologuard 06/26/2024         Cervical cancer screening    Overview     Last pap smear 09/24/2007 negative         Medicare annual wellness visit, subsequent - Primary    Overview     Diet exercise breast self exams, seat belts, sunscreens, fall prevention risk is moderate, and general safety discussed and encouraged.         Relevant Orders    POCT urinalysis dipstick, manual (Completed)    Overweight (BMI 25.0-29.9)    Overview     Healthy diet and regular exercise encouraged.          Current Assessment & Plan     Patient's (Body mass index is 28.12 kg/m².) indicates that they are overweight with health conditions that include hypertension . Weight is unchanged. BMI is is above average; BMI management plan is completed. We discussed portion control and increasing exercise.          Thyroid nodule    Overview     Seen on CT 12/03/2021, stable on u/s 2023, benign bx 2023  Normal thyroid function no sxs  No sxs         Relevant Orders    US Thyroid     Other Visit Diagnoses       Lipid screening        Relevant Orders    Lipid Panel With / Chol / HDL Ratio (Completed)    TSH (Completed)    Bilateral impacted cerumen        irrigated until clear.    Relevant Orders    Ear Cerumen Removal    Urinary frequency        Relevant Orders    Urinalysis With Microscopic - Urine, Random Void (Completed)            Follow Up Instructions Charge Capture  Follow-up Communications  Return in about 3 months (around 9/26/2024).  Patient was given instructions and counseling regarding her condition or for health maintenance advice. Please see specific information pulled into the AVS if appropriate.

## 2024-06-24 NOTE — PROGRESS NOTES
The ABCs of the Annual Wellness Visit  Subsequent Medicare Wellness Visit      Subjective   History of Present Illness:  Jess Mackay is a 74 y.o. female who presents for a Subsequent Medicare Wellness Visit. The patient is here: for coordination of medical care to discuss health maintenance and disease prevention to follow up on multiple medical problems. Overall has: moderate activity with work/home activities, exercises 2 - 3 times per week, good appetite, feels well with minor complaints, good energy level, is sleeping well, and shown improvement in their activity level. Nutrition: appropriate balanced diet and eating a variety of foods. Last tetanus shot was   .  Jess Mackay is -2, Parity-2. No LMP recorded. Patient is postmenopausal. She is postmenopausal.     The following portions of the patient's history were reviewed and   updated as appropriate: allergies, current medications, past family history, past medical history, past social history, past surgical history, and problem list.    Compared to one year ago, the patient feels her physical   health is the same.    Compared to one year ago, the patient feels her mental   health is the same.    Recent Hospitalizations:  She was not admitted to the hospital during the last year.       Current Medical Providers:  Patient Care Team:  Ivette Schaefer MD as PCP - General  Pelon Montiel DC (Chiropractic Medicine)  Niki Kurtz MD (Dermatology)    Outpatient Medications Prior to Visit   Medication Sig Dispense Refill    alendronate (FOSAMAX) 70 MG tablet 1 TAB EVERY 7 DAYS WITH 8 OZ WATER 30MIN BEFORE 1ST FOOD, DRINK, OR MED. AVOID LYING DOWN FOR 30MIN. 12 tablet 1    aspirin 325 MG tablet Take 1 tablet by mouth Daily.      Calcium 600-200 MG-UNIT per tablet Take 1 tablet by mouth Daily.      fexofenadine (ALLEGRA) 180 MG tablet Take 1 tablet by mouth Daily.      Multiple Vitamins-Minerals (ZINC PO) Take 1 tablet by mouth  Daily.      potassium chloride ER (K-TAB) 20 MEQ tablet controlled-release ER tablet Take 1 tablet by mouth Daily. 90 tablet 3    vitamin D (ERGOCALCIFEROL) 1.25 MG (73027 UT) capsule capsule Take 1 capsule by mouth 1 (One) Time Per Week.      amLODIPine (NORVASC) 10 MG tablet TAKE 1 TABLET BY MOUTH EVERY DAY 90 tablet 3    hydroCHLOROthiazide (HYDRODIURIL) 25 MG tablet TAKE 1 TABLET BY MOUTH EVERY DAY 90 tablet 3     No facility-administered medications prior to visit.       No opioid medication identified on active medication list. I have reviewed chart for other potential  high risk medication/s and harmful drug interactions in the elderly.        Aspirin is on active medication list. Aspirin use is indicated based on review of current medical condition/s. Pros and cons of this therapy have been discussed today. Benefits of this medication outweigh potential harm.  Patient has been encouraged to continue taking this medication.  .      Patient Active Problem List   Diagnosis    Encounter for screening mammogram for malignant neoplasm of breast    Colon cancer screening    Cervical cancer screening    Osteopenia of left hip    Allergic rhinitis due to pollen    Benign hypertension    History of tobacco use    Leg length discrepancy    Scoliosis, congenital    Varus deformity of great toe, left    Medicare annual wellness visit, subsequent    Mixed stress and urge urinary incontinence    Osteopenia of neck of femur    Arteriosclerotic cardiovascular disease (ASCVD)    Lumbar disc disease with radiculopathy    Overweight (BMI 25.0-29.9)    History of COVID-19    Lung nodule < 6cm on CT    Thyroid nodule    Prediabetes    Panlobular emphysema     Advance Directive is not on file.  ACP discussion was held with the patient during this visit. Patient does not have an advance directive, declines further assistance.     Objective    Vitals:    06/26/24 1509   BP: 128/68   BP Location: Left arm   Patient Position: Sitting  "  Cuff Size: Adult   Pulse: 69   Resp: 18   Temp: 98.4 °F (36.9 °C)   TempSrc: Temporal   SpO2: 94%  Comment: room air   Weight: 73.1 kg (161 lb 4 oz)   Height: 161.3 cm (63.5\")   PainSc: 0-No pain     Estimated body mass index is 28.12 kg/m² as calculated from the following:    Height as of this encounter: 161.3 cm (63.5\").    Weight as of this encounter: 73.1 kg (161 lb 4 oz).           Does the patient have evidence of cognitive impairment?   No    Lab Results   Component Value Date    CHLPL 191 2024    TRIG 83 2024    HDL 69 2024     (H) 2024    VLDL 15 2024    HGBA1C 5.9 (H) 2024          HEALTH RISK ASSESSMENT    Smoking Status:  Social History     Tobacco Use   Smoking Status Former    Current packs/day: 0.00    Average packs/day: 0.5 packs/day for 35.0 years (17.5 ttl pk-yrs)    Types: Cigarettes    Start date: 1974    Quit date: 2009    Years since quitting: 15.5   Smokeless Tobacco Never     Alcohol Consumption:  Social History     Substance and Sexual Activity   Alcohol Use Yes    Alcohol/week: 3.0 - 5.0 standard drinks of alcohol    Types: 3 - 5 Cans of beer per week    Comment: on occasion     Fall Risk Screen:    FRIDA Fall Risk Assessment was completed, and patient is at LOW risk for falls.Assessment completed on:2024    Depression Screenin/26/2024     3:12 PM   PHQ-2/PHQ-9 Depression Screening   Little Interest or Pleasure in Doing Things 0-->not at all   Feeling Down, Depressed or Hopeless 0-->not at all   PHQ-9: Brief Depression Severity Measure Score 0     I spent 5 min asking patient questions, counseling and documenting in the chart    Health Habits and Functional and Cognitive Screenin/26/2024     3:00 PM   Functional & Cognitive Status   Do you have difficulty preparing food and eating? No   Do you have difficulty bathing yourself, getting dressed or grooming yourself? No   Do you have difficulty using the toilet? No "   Do you have difficulty moving around from place to place? Yes   Do you have trouble with steps or getting out of a bed or a chair? Yes   Current Diet Well Balanced Diet   Dental Exam Up to date   Eye Exam Not up to date   Exercise (times per week) 7 times per week   Current Exercises Include Walking   Do you need help using the phone?  No   Are you deaf or do you have serious difficulty hearing?  No   Do you need help to go to places out of walking distance? Yes   Do you need help shopping? No   Do you need help preparing meals?  No   Do you need help with housework?  No   Do you need help with laundry? No   Do you need help taking your medications? No   Do you need help managing money? No   Do you ever drive or ride in a car without wearing a seat belt? No   Have you felt unusual stress, anger or loneliness in the last month? No   Who do you live with? Spouse   If you need help, do you have trouble finding someone available to you? No   Have you been bothered in the last four weeks by sexual problems? No   Do you have difficulty concentrating, remembering or making decisions? No       Age-appropriate Screening Schedule:  Refer to the list below for future screening recommendations based on patient's age, sex and/or medical conditions. Orders for these recommended tests are listed in the plan section. The patient has been provided with a written plan.    Health Maintenance   Topic Date Due    COLORECTAL CANCER SCREENING  Never done    Pneumococcal Vaccine 65+ (1 of 2 - PCV) Never done    ZOSTER VACCINE (1 of 2) Never done    COVID-19 Vaccine (1 - 2023-24 season) Never done    TDAP/TD VACCINES (3 - Td or Tdap) 04/16/2024    ANNUAL WELLNESS VISIT  06/26/2024    INFLUENZA VACCINE  08/01/2024    BMI FOLLOWUP  06/26/2025    MAMMOGRAM  07/20/2025    DXA SCAN  07/20/2025    HEPATITIS C SCREENING  Completed    LUNG CANCER SCREENING  Discontinued                  CMS Preventative Services Quick Reference  Risk Factors  Identified During Encounter:    Fall Risk-High or Moderate: Information on Fall Prevention Shared in After Visit Summary    The above risks/problems have been discussed with the patient.  Pertinent information has been shared with the patient in the After Visit Summary.    Diagnoses and all orders for this visit:    1. Medicare annual wellness visit, subsequent (Primary)  -     POCT urinalysis dipstick, manual    2. Benign hypertension  Assessment & Plan:  Hypertension is stable and controlled  Continue current treatment regimen.  Blood pressure will be reassessed in 3 months.    Orders:  -     CBC & Differential  -     Comprehensive Metabolic Panel    3. Prediabetes    4. Panlobular emphysema    5. Thyroid nodule  -     US Thyroid; Future    6. Scoliosis, congenital    7. Leg length discrepancy    8. Lumbar disc disease with radiculopathy    9. Varus deformity of great toe, left    10. Seasonal allergic rhinitis due to pollen    11. Osteopenia of necks of both femurs    12. Encounter for screening mammogram for malignant neoplasm of breast  -     Mammo Screening Digital Tomosynthesis Bilateral With CAD; Future    13. Lipid screening  -     Lipid Panel With / Chol / HDL Ratio  -     TSH    14. Colon cancer screening    15. Cervical cancer screening    16. History of tobacco use  -      CT Chest Low Dose Cancer Screening WO; Future    17. Overweight (BMI 25.0-29.9)  Assessment & Plan:  Patient's (Body mass index is 28.12 kg/m².) indicates that they are overweight with health conditions that include hypertension . Weight is unchanged. BMI is is above average; BMI management plan is completed. We discussed portion control and increasing exercise.       18. Bilateral impacted cerumen  Comments:  irrigated until clear.  Orders:  -     Ear Cerumen Removal    19. Urinary frequency  -     Urinalysis With Microscopic - Urine, Random Void    Other orders  -     Microscopic Examination -        Follow Up:   Next Medicare  Wellness visit to be scheduled in 1 year.      An After Visit Summary and PPPS were made available to the patient.      Outpatient Medications Prior to Visit   Medication Sig Dispense Refill    alendronate (FOSAMAX) 70 MG tablet 1 TAB EVERY 7 DAYS WITH 8 OZ WATER 30MIN BEFORE 1ST FOOD, DRINK, OR MED. AVOID LYING DOWN FOR 30MIN. 12 tablet 1    aspirin 325 MG tablet Take 1 tablet by mouth Daily.      Calcium 600-200 MG-UNIT per tablet Take 1 tablet by mouth Daily.      fexofenadine (ALLEGRA) 180 MG tablet Take 1 tablet by mouth Daily.      Multiple Vitamins-Minerals (ZINC PO) Take 1 tablet by mouth Daily.      potassium chloride ER (K-TAB) 20 MEQ tablet controlled-release ER tablet Take 1 tablet by mouth Daily. 90 tablet 3    vitamin D (ERGOCALCIFEROL) 1.25 MG (62066 UT) capsule capsule Take 1 capsule by mouth 1 (One) Time Per Week.      amLODIPine (NORVASC) 10 MG tablet TAKE 1 TABLET BY MOUTH EVERY DAY 90 tablet 3    hydroCHLOROthiazide (HYDRODIURIL) 25 MG tablet TAKE 1 TABLET BY MOUTH EVERY DAY 90 tablet 3     No facility-administered medications prior to visit.       Patient Active Problem List   Diagnosis    Encounter for screening mammogram for malignant neoplasm of breast    Colon cancer screening    Cervical cancer screening    Osteopenia of left hip    Allergic rhinitis due to pollen    Benign hypertension    History of tobacco use    Leg length discrepancy    Scoliosis, congenital    Varus deformity of great toe, left    Medicare annual wellness visit, subsequent    Mixed stress and urge urinary incontinence    Osteopenia of neck of femur    Arteriosclerotic cardiovascular disease (ASCVD)    Lumbar disc disease with radiculopathy    Overweight (BMI 25.0-29.9)    History of COVID-19    Lung nodule < 6cm on CT    Thyroid nodule    Prediabetes    Panlobular emphysema         A face-to-face visit was completed today with patient.  Counseling explanation, and discussion of advanced directives was performed.   The  "last advanced care visit was performed in 2023.  In a near life ending situation, from which she is not expected to recover functionally, and she is not able to speak for hersel, she does not want life sustaining measures. We discussed feeding tubes, ventilators and cardiac support as well as dialysis.    I spent more than 16 minutes discussing Advanced Care Planning information and documenting in the chart.    Review of Systems    I have reviewed and confirmed the accuracy of the HPI and ROS as documented by the MA/LPN/RN Ivette Schaefer MD        Reviewed chart for potential of high risk medication in the elderly: yes  Reviewed chart for potential of harmful drug interactions in the elderly:yes    Objective      Vitals:    06/26/24 1509   BP: 128/68   BP Location: Left arm   Patient Position: Sitting   Cuff Size: Adult   Pulse: 69   Resp: 18   Temp: 98.4 °F (36.9 °C)   TempSrc: Temporal   SpO2: 94%   Weight: 73.1 kg (161 lb 4 oz)   Height: 161.3 cm (63.5\")   PainSc: 0-No pain       Body mass index is 28.12 kg/m².  Discussed the patient's BMI with her. The BMI is above average; BMI management plan is completed.      An After Visit Summary and PPPS were given to the patient.              "

## 2024-06-26 ENCOUNTER — OFFICE VISIT (OUTPATIENT)
Dept: FAMILY MEDICINE CLINIC | Facility: CLINIC | Age: 74
End: 2024-06-26
Payer: MEDICARE

## 2024-06-26 VITALS
WEIGHT: 161.25 LBS | TEMPERATURE: 98.4 F | OXYGEN SATURATION: 94 % | DIASTOLIC BLOOD PRESSURE: 68 MMHG | HEIGHT: 64 IN | BODY MASS INDEX: 27.53 KG/M2 | RESPIRATION RATE: 18 BRPM | SYSTOLIC BLOOD PRESSURE: 128 MMHG | HEART RATE: 69 BPM

## 2024-06-26 DIAGNOSIS — I10 BENIGN HYPERTENSION: ICD-10-CM

## 2024-06-26 DIAGNOSIS — R35.0 URINARY FREQUENCY: ICD-10-CM

## 2024-06-26 DIAGNOSIS — M85.852 OSTEOPENIA OF NECKS OF BOTH FEMURS: ICD-10-CM

## 2024-06-26 DIAGNOSIS — J30.1 SEASONAL ALLERGIC RHINITIS DUE TO POLLEN: ICD-10-CM

## 2024-06-26 DIAGNOSIS — E04.1 THYROID NODULE: ICD-10-CM

## 2024-06-26 DIAGNOSIS — M51.16 LUMBAR DISC DISEASE WITH RADICULOPATHY: ICD-10-CM

## 2024-06-26 DIAGNOSIS — Z87.891 HISTORY OF TOBACCO USE: ICD-10-CM

## 2024-06-26 DIAGNOSIS — R73.03 PREDIABETES: ICD-10-CM

## 2024-06-26 DIAGNOSIS — Z00.00 MEDICARE ANNUAL WELLNESS VISIT, SUBSEQUENT: Primary | ICD-10-CM

## 2024-06-26 DIAGNOSIS — Z12.31 ENCOUNTER FOR SCREENING MAMMOGRAM FOR MALIGNANT NEOPLASM OF BREAST: ICD-10-CM

## 2024-06-26 DIAGNOSIS — Z12.11 COLON CANCER SCREENING: ICD-10-CM

## 2024-06-26 DIAGNOSIS — Q67.5 SCOLIOSIS, CONGENITAL: ICD-10-CM

## 2024-06-26 DIAGNOSIS — M85.851 OSTEOPENIA OF NECKS OF BOTH FEMURS: ICD-10-CM

## 2024-06-26 DIAGNOSIS — Z13.220 LIPID SCREENING: ICD-10-CM

## 2024-06-26 DIAGNOSIS — H61.23 BILATERAL IMPACTED CERUMEN: ICD-10-CM

## 2024-06-26 DIAGNOSIS — E66.3 OVERWEIGHT (BMI 25.0-29.9): ICD-10-CM

## 2024-06-26 DIAGNOSIS — M21.70 LEG LENGTH DISCREPANCY: ICD-10-CM

## 2024-06-26 DIAGNOSIS — J43.1 PANLOBULAR EMPHYSEMA: ICD-10-CM

## 2024-06-26 DIAGNOSIS — M20.32: ICD-10-CM

## 2024-06-26 DIAGNOSIS — Z12.4 CERVICAL CANCER SCREENING: ICD-10-CM

## 2024-06-26 LAB
BILIRUB BLD-MCNC: NEGATIVE MG/DL
CLARITY, POC: CLEAR
COLOR UR: YELLOW
GLUCOSE UR STRIP-MCNC: NEGATIVE MG/DL
KETONES UR QL: NEGATIVE
LEUKOCYTE EST, POC: NEGATIVE
NITRITE UR-MCNC: NEGATIVE MG/ML
PH UR: 7.5 [PH] (ref 5–8)
PROT UR STRIP-MCNC: ABNORMAL MG/DL
RBC # UR STRIP: ABNORMAL /UL
SP GR UR: 1.01 (ref 1–1.03)
UROBILINOGEN UR QL: NORMAL

## 2024-06-26 NOTE — ASSESSMENT & PLAN NOTE
Patient's (Body mass index is 28.12 kg/m².) indicates that they are overweight with health conditions that include hypertension . Weight is unchanged. BMI is is above average; BMI management plan is completed. We discussed portion control and increasing exercise.

## 2024-06-27 LAB
ALBUMIN SERPL-MCNC: 4.2 G/DL (ref 3.8–4.8)
ALP SERPL-CCNC: 50 IU/L (ref 44–121)
ALT SERPL-CCNC: 15 IU/L (ref 0–32)
APPEARANCE UR: CLEAR
AST SERPL-CCNC: 20 IU/L (ref 0–40)
BACTERIA #/AREA URNS HPF: NORMAL /[HPF]
BASOPHILS # BLD AUTO: 0.1 X10E3/UL (ref 0–0.2)
BASOPHILS NFR BLD AUTO: 1 %
BILIRUB SERPL-MCNC: 0.3 MG/DL (ref 0–1.2)
BILIRUB UR QL STRIP: NEGATIVE
BUN SERPL-MCNC: 19 MG/DL (ref 8–27)
BUN/CREAT SERPL: 23 (ref 12–28)
CALCIUM SERPL-MCNC: 10.1 MG/DL (ref 8.7–10.3)
CASTS URNS QL MICRO: NORMAL /LPF
CHLORIDE SERPL-SCNC: 101 MMOL/L (ref 96–106)
CHOLEST SERPL-MCNC: 191 MG/DL (ref 100–199)
CHOLEST/HDLC SERPL: 2.8 RATIO (ref 0–4.4)
CO2 SERPL-SCNC: 23 MMOL/L (ref 20–29)
COLOR UR: YELLOW
CREAT SERPL-MCNC: 0.83 MG/DL (ref 0.57–1)
EGFRCR SERPLBLD CKD-EPI 2021: 74 ML/MIN/1.73
EOSINOPHIL # BLD AUTO: 0.1 X10E3/UL (ref 0–0.4)
EOSINOPHIL NFR BLD AUTO: 2 %
EPI CELLS #/AREA URNS HPF: NORMAL /HPF (ref 0–10)
ERYTHROCYTE [DISTWIDTH] IN BLOOD BY AUTOMATED COUNT: 12.1 % (ref 11.7–15.4)
GLOBULIN SER CALC-MCNC: 2.8 G/DL (ref 1.5–4.5)
GLUCOSE SERPL-MCNC: 102 MG/DL (ref 70–99)
GLUCOSE UR QL STRIP: NEGATIVE
HCT VFR BLD AUTO: 40.7 % (ref 34–46.6)
HDLC SERPL-MCNC: 69 MG/DL
HGB BLD-MCNC: 13.7 G/DL (ref 11.1–15.9)
HGB UR QL STRIP: NEGATIVE
IMM GRANULOCYTES # BLD AUTO: 0 X10E3/UL (ref 0–0.1)
IMM GRANULOCYTES NFR BLD AUTO: 0 %
KETONES UR QL STRIP: NEGATIVE
LDLC SERPL CALC-MCNC: 107 MG/DL (ref 0–99)
LEUKOCYTE ESTERASE UR QL STRIP: NEGATIVE
LYMPHOCYTES # BLD AUTO: 1.7 X10E3/UL (ref 0.7–3.1)
LYMPHOCYTES NFR BLD AUTO: 25 %
MCH RBC QN AUTO: 30.4 PG (ref 26.6–33)
MCHC RBC AUTO-ENTMCNC: 33.7 G/DL (ref 31.5–35.7)
MCV RBC AUTO: 90 FL (ref 79–97)
MICRO URNS: NORMAL
MICRO URNS: NORMAL
MONOCYTES # BLD AUTO: 0.7 X10E3/UL (ref 0.1–0.9)
MONOCYTES NFR BLD AUTO: 10 %
NEUTROPHILS # BLD AUTO: 4.3 X10E3/UL (ref 1.4–7)
NEUTROPHILS NFR BLD AUTO: 62 %
NITRITE UR QL STRIP: NEGATIVE
PH UR STRIP: 7 [PH] (ref 5–7.5)
PLATELET # BLD AUTO: 310 X10E3/UL (ref 150–450)
POTASSIUM SERPL-SCNC: 3.6 MMOL/L (ref 3.5–5.2)
PROT SERPL-MCNC: 7 G/DL (ref 6–8.5)
PROT UR QL STRIP: NEGATIVE
RBC # BLD AUTO: 4.5 X10E6/UL (ref 3.77–5.28)
RBC #/AREA URNS HPF: NORMAL /HPF (ref 0–2)
SODIUM SERPL-SCNC: 139 MMOL/L (ref 134–144)
SP GR UR STRIP: 1.01 (ref 1–1.03)
TRIGL SERPL-MCNC: 83 MG/DL (ref 0–149)
TSH SERPL DL<=0.005 MIU/L-ACNC: 1.08 UIU/ML (ref 0.45–4.5)
UROBILINOGEN UR STRIP-MCNC: 0.2 MG/DL (ref 0.2–1)
VLDLC SERPL CALC-MCNC: 15 MG/DL (ref 5–40)
WBC # BLD AUTO: 6.9 X10E3/UL (ref 3.4–10.8)
WBC #/AREA URNS HPF: NORMAL /HPF (ref 0–5)

## 2024-07-09 DIAGNOSIS — I10 BENIGN HYPERTENSION: ICD-10-CM

## 2024-07-09 RX ORDER — HYDROCHLOROTHIAZIDE 25 MG/1
TABLET ORAL
Qty: 90 TABLET | Refills: 3 | Status: SHIPPED | OUTPATIENT
Start: 2024-07-09

## 2024-07-09 RX ORDER — AMLODIPINE BESYLATE 10 MG/1
TABLET ORAL
Qty: 90 TABLET | Refills: 3 | Status: SHIPPED | OUTPATIENT
Start: 2024-07-09

## 2024-08-01 ENCOUNTER — PATIENT MESSAGE (OUTPATIENT)
Dept: FAMILY MEDICINE CLINIC | Facility: CLINIC | Age: 74
End: 2024-08-01
Payer: MEDICARE

## 2024-08-05 NOTE — PROGRESS NOTES
Chief Complaint  Prediabetes and Hypertension    Subjective     CC  Problem List  Visit Diagnosis   Encounters  Notes  Medications  Labs  Result Review Imaging  Media    eJss Mackay presents to [facility] for   Blood Sugar Problem  This is a new problem. The current episode started more than 1 month ago. The problem occurs constantly. The problem has been gradually improving. Pertinent negatives include no abdominal pain, chest pain, headaches, nausea, numbness, rash, vomiting or weakness. The symptoms are aggravated by eating. She has tried nothing for the symptoms.   Hypertension  This is a chronic problem. The current episode started more than 1 year ago. The problem has been stable since onset. The problem is controlled. Pertinent negatives include no anxiety, blurred vision, chest pain, headaches, palpitations or shortness of breath. There are no associated agents to hypertension. Risk factors for coronary artery disease include post-menopausal state, diabetes mellitus and smoking/tobacco exposure. Past treatments include nothing. Current antihypertension treatment includes calcium channel blockers and diuretics. The current treatment provides mild improvement. There are no compliance problems.    Back Pain  This is a chronic problem. The problem occurs intermittently. The problem has been comes and goes since onset. The pain is present in the lumbar spine. The pain does not radiate. The pain is at a severity of 5/10. The pain is moderate. The symptoms are aggravated by position. Pertinent negatives include no abdominal pain, bladder incontinence, bowel incontinence, chest pain, headaches, numbness, weakness or weight loss. (She has severe scoliosis.)       Review of Systems   Constitutional:  Negative for unexpected weight loss.   Eyes:  Negative for blurred vision.   Respiratory:  Negative for shortness of breath.    Cardiovascular:  Negative for chest pain,  "palpitations and leg swelling.   Gastrointestinal:  Negative for abdominal pain, bowel incontinence, constipation, diarrhea, nausea and vomiting.   Endocrine: Negative for cold intolerance, heat intolerance, polydipsia and polyuria.   Genitourinary:  Negative for urinary incontinence.   Musculoskeletal:  Positive for back pain.   Skin:  Negative for rash.   Neurological:  Negative for weakness and numbness.   Hematological:  Negative for adenopathy. Does not bruise/bleed easily.        Objective   Vital Signs:   /70 (BP Location: Right arm, Patient Position: Sitting, Cuff Size: Adult)   Pulse 72   Temp 98.4 °F (36.9 °C) (Temporal)   Resp 18   Ht 161.3 cm (63.5\")   Wt 72.7 kg (160 lb 6 oz)   SpO2 95% Comment: room air  BMI 27.96 kg/m²     Physical Exam  Constitutional:       General: She is not in acute distress.  Cardiovascular:      Rate and Rhythm: Normal rate and regular rhythm.      Heart sounds: No murmur heard.  Pulmonary:      Effort: Pulmonary effort is normal.      Breath sounds: Normal breath sounds.   Musculoskeletal:      Cervical back: Neck supple.      Lumbar back: Deformity present. No spasms, tenderness or bony tenderness. Decreased range of motion. Negative right straight leg raise test and negative left straight leg raise test.      Right lower leg: No edema.      Left lower leg: No edema.   Lymphadenopathy:      Cervical: No cervical adenopathy.   Skin:     Findings: No rash.   Neurological:      Mental Status: She is alert.      Result Review :Labs  Result Review  Imaging  Med Tab  Media                 Assessment and Plan CC Problem List  Visit Diagnosis  ROS  Review (Popup)  Health Maintenance  Quality  BestPractice  Medications  SmartSets  SnapShot Encounters  Media  Problem List Items Addressed This Visit          High    Benign hypertension    Overview     Controlled compliant after adjustment of meds.          Prediabetes - Primary    Overview     A1 5.9 " 08/09/2024 stable continue life style changes.   A1c 5.9 05/08/2024  The importance of maintaining lower weights and regular exercise discussed and understood, in terms of potentially preventing DM.         Panlobular emphysema    Overview     Moderate  on CT 12/2023  No sxs on no meds.             Medium    Leg length discrepancy    Overview     LLE shorter than right by 6 inches. Stable elevated show, acquired scoliosis severe.         Scoliosis, congenital    Overview     Severe and worsening with age. She is ambulating with a cane/walking stick. [facility] referral at request.          Lumbar disc disease with radiculopathy    Overview     Severe with moderate lower lumbar curve with moderate degenerative changes. Pt to capitalize on muscular compensation. Sxs are fairly stable with good back mechanics. She ambulates with a cane            Low    History of tobacco use    Overview     1 ppd x 35 yrs.   Low dose CT ordered. 11/30/2020 nodule repeat at 6 mos intervals  stable 06/22 ,negative 06/26/2023, repeat ordered            Unprioritized    Overweight (BMI 25.0-29.9)    Overview     Healthy diet and regular exercise encouraged.          Current Assessment & Plan     Patient's (Body mass index is 27.96 kg/m².) indicates that they are overweight with health conditions that include hypertension . Weight is unchanged. BMI is above average; BMI management plan is completed. We discussed portion control and increasing exercise.           Other Visit Diagnoses       Hyperglycemia        Relevant Orders    POCT urinalysis dipstick, manual (Completed)    POC Glycosylated Hemoglobin (Hb A1C) (Completed)            Follow Up Instructions Charge Capture  Follow-up Communications  Return in about 3 months (around 11/9/2024).  Patient was given instructions and counseling regarding her condition or for health maintenance advice. Please see specific information pulled into the AVS if appropriate.

## 2024-08-09 ENCOUNTER — OFFICE VISIT (OUTPATIENT)
Dept: FAMILY MEDICINE CLINIC | Facility: CLINIC | Age: 74
End: 2024-08-09
Payer: MEDICARE

## 2024-08-09 VITALS
HEIGHT: 64 IN | OXYGEN SATURATION: 95 % | BODY MASS INDEX: 27.38 KG/M2 | RESPIRATION RATE: 18 BRPM | SYSTOLIC BLOOD PRESSURE: 126 MMHG | DIASTOLIC BLOOD PRESSURE: 70 MMHG | HEART RATE: 72 BPM | WEIGHT: 160.38 LBS | TEMPERATURE: 98.4 F

## 2024-08-09 DIAGNOSIS — Z87.891 HISTORY OF TOBACCO USE: ICD-10-CM

## 2024-08-09 DIAGNOSIS — M51.16 LUMBAR DISC DISEASE WITH RADICULOPATHY: ICD-10-CM

## 2024-08-09 DIAGNOSIS — I10 BENIGN HYPERTENSION: ICD-10-CM

## 2024-08-09 DIAGNOSIS — E66.3 OVERWEIGHT (BMI 25.0-29.9): ICD-10-CM

## 2024-08-09 DIAGNOSIS — M21.70 LEG LENGTH DISCREPANCY: ICD-10-CM

## 2024-08-09 DIAGNOSIS — Q67.5 SCOLIOSIS, CONGENITAL: ICD-10-CM

## 2024-08-09 DIAGNOSIS — J43.1 PANLOBULAR EMPHYSEMA: ICD-10-CM

## 2024-08-09 DIAGNOSIS — R73.03 PREDIABETES: Primary | ICD-10-CM

## 2024-08-09 DIAGNOSIS — R73.9 HYPERGLYCEMIA: ICD-10-CM

## 2024-08-09 LAB
BILIRUB BLD-MCNC: NEGATIVE MG/DL
CLARITY, POC: CLEAR
COLOR UR: YELLOW
EXPIRATION DATE: ABNORMAL
GLUCOSE UR STRIP-MCNC: NEGATIVE MG/DL
HBA1C MFR BLD: 5.9 % (ref 4.5–5.7)
KETONES UR QL: NEGATIVE
LEUKOCYTE EST, POC: NEGATIVE
Lab: ABNORMAL
NITRITE UR-MCNC: NEGATIVE MG/ML
PH UR: 6.5 [PH] (ref 5–8)
PROT UR STRIP-MCNC: ABNORMAL MG/DL
RBC # UR STRIP: NEGATIVE /UL
SP GR UR: 1 (ref 1–1.03)
UROBILINOGEN UR QL: NORMAL

## 2024-08-09 NOTE — ASSESSMENT & PLAN NOTE
Patient's (Body mass index is 27.96 kg/m².) indicates that they are overweight with health conditions that include hypertension . Weight is unchanged. BMI is above average; BMI management plan is completed. We discussed portion control and increasing exercise.

## 2024-09-30 DIAGNOSIS — M85.851 OSTEOPENIA OF NECKS OF BOTH FEMURS: ICD-10-CM

## 2024-09-30 DIAGNOSIS — M85.852 OSTEOPENIA OF NECKS OF BOTH FEMURS: ICD-10-CM

## 2024-09-30 RX ORDER — ALENDRONATE SODIUM 70 MG/1
TABLET ORAL
Qty: 12 TABLET | Refills: 1 | Status: SHIPPED | OUTPATIENT
Start: 2024-09-30 | End: 2024-10-01 | Stop reason: SDUPTHER

## 2024-10-01 DIAGNOSIS — M85.851 OSTEOPENIA OF NECKS OF BOTH FEMURS: ICD-10-CM

## 2024-10-01 DIAGNOSIS — M85.852 OSTEOPENIA OF NECKS OF BOTH FEMURS: ICD-10-CM

## 2024-10-01 RX ORDER — ALENDRONATE SODIUM 70 MG/1
70 TABLET ORAL
Qty: 12 TABLET | Refills: 3 | Status: SHIPPED | OUTPATIENT
Start: 2024-10-01

## 2024-10-14 NOTE — PROGRESS NOTES
"Chief Complaint  Establish Care, Hypertension, and Prediabetes    Subjective          Jess Mackay presents to [facility] for     HPI    Hypertension, hypokalemia  Patient has been checking blood pressure at home. Took blood pressure one time on 10/14/2024 and it has been running 130s-140s/70s-80s.  Patient reports   nervous and anxiousness - has some stress at home currently. Has only been for the past couple weeks. Going out for walks with dog helps.  Patient reports they are taking medications as prescribed and they are not having side effects.  Regimen: HCTZ 25 mg qd, amlodipine 10 mg qd.  Tends to run borderline hypokalemic.    Prediabetes  Stable - A1c 5.9 today, 2 mo ago, 5 mo ago.  Is not currently following low concentrated sweets diet.     Objective   Vital Signs:   /82 (BP Location: Right arm, Patient Position: Sitting, Cuff Size: Adult)   Pulse 82   Temp 98.3 °F (36.8 °C) (Temporal)   Resp 18   Ht 161.3 cm (63.5\")   Wt 73.7 kg (162 lb 8 oz)   SpO2 97% Comment: room air  BMI 28.33 kg/m²     Physical Exam  Vitals and nursing note reviewed.   Constitutional:       General: She is not in acute distress.     Appearance: Normal appearance. She is not ill-appearing or diaphoretic.   HENT:      Head: Normocephalic and atraumatic.      Nose: No congestion or rhinorrhea.   Eyes:      Extraocular Movements: Extraocular movements intact.      Pupils: Pupils are equal, round, and reactive to light.   Cardiovascular:      Rate and Rhythm: Normal rate and regular rhythm.      Pulses: Normal pulses.   Pulmonary:      Effort: Pulmonary effort is normal.      Breath sounds: Normal breath sounds.   Musculoskeletal:         General: Normal range of motion.      Cervical back: Normal range of motion and neck supple.   Skin:     General: Skin is warm and dry.      Capillary Refill: Capillary refill takes less than 2 seconds.   Neurological:      Mental Status: She is alert " and oriented to person, place, and time.   Psychiatric:         Mood and Affect: Mood normal.         Behavior: Behavior normal.        Result Review :                 Assessment and Plan    Diagnoses and all orders for this visit:    1. Benign hypertension (Primary)  Overview:  Regimen: Hctz 25 mg qd, amlodipine 10 mg qd.  Runs borderline hypokalemia, is on 20 meq daily K+ supplementation.  Compliant.  BP has been a bit higher than normal lately, 130s-140s/70s-80s. Has been under stress at home for past couple weeks.    Assessment & Plan:  Patient with hx of hypokalemia, borderline low potassium levels, despite daily 20 meq potassium.  Do feel she would benefit from transitioning off of the hctz and changing instead to an ARB.  However, she is leaving the state for 6 months now so this is not a good time to start this change.  We will check a BMP and magnesium today. She is to continue current regimen for now.  When she returns in April, we will start weaning her off of hctz and potassium supplement and get her on an ARB with close monitoring/follow up.  BP has been running a bit high for a couple weeks but has been under new stress/anxiety. She is to continue monitoring and let us know if it is running >150/90.    Orders:  -     Basic Metabolic Panel    2. Hypokalemia  -     Basic Metabolic Panel  -     Cancel: Magnesium; Future  -     Magnesium Citrate 100 MG capsule; Take 100 mg by mouth Daily.  Dispense: 90 capsule; Refill: 3  -     Magnesium    3. Anxiety  -     Magnesium Citrate 100 MG capsule; Take 100 mg by mouth Daily.  Dispense: 90 capsule; Refill: 3    4. Prediabetes  Overview:  A1c 5.9 10/15/2024 - stable continue life style changes.   A1c 5.9 08/09/2024  A1c 5.9 05/08/2024  The importance of maintaining lower weights and regular exercise discussed and understood, in terms of potentially preventing DM.      5. Overweight (BMI 25.0-29.9)  Overview:  Healthy diet and regular exercise encouraged.      Assessment & Plan:  Patient's (Body mass index is 28.33 kg/m².) indicates that they are overweight with health conditions that include hypertension . Weight is unchanged. BMI is above average; BMI management plan is completed. We discussed portion control and increasing exercise.       6. Hyperglycemia  -     POC Glycosylated Hemoglobin (Hb A1C)         Ashu Cardenas MD    NOTE: [employer], per Health Information accessibility laws, allows progress notes to be visible to patients. Please note that these notes will include professional medical terminology that may be somewhat confusing without some interpretation from your medical professional team. The intent of progress notes is to communicate information between any medical professionals involved in your case, or to serve as future reference for myself or any other provider when reviewing your case, as well as a reference for the patient viewing the record. Please ask a member of the medical team if you have any questions about terminology or content of note.    DISCLAIMER: This note was transcribed using a Dragon Medical voice recognition system. It may contain mistakes that may have not been recognized during proofreading. This note was also amended on the date signed due to recognized errors.

## 2024-10-14 NOTE — OUTREACH NOTE
COVID-19 Week 1 Survey      Responses   [facility] patient discharged from? Jun   Does the patient have one of the following disease processes/diagnoses(primary or secondary)? COVID-19   COVID-19 underlying condition? None   Call Number Call 3   Week 1 Call successful? Yes   Call start time [phone]   Call end time [phone]   Discharge diagnosis Pneumonia due to COVID-19 virus    Person spoke with today (if not patient) and relationship Patient   Meds reviewed with patient/caregiver? Yes   Is the patient having any side effects they believe may be caused by any medication additions or changes? No   Does the patient have all medications ordered at discharge? Yes   Is the patient taking all medications as directed (includes completed medication regime)? Yes   Does the patient have a primary care provider?  Yes   Comments regarding PCP 12/15/21   Does the patient have an appointment with their PCP or specialist within 7 days of discharge? Yes   Has the patient kept scheduled appointments due by today? N/A   Psychosocial issues? No   What is the patient's perception of their health status since discharge? Improving   Does the patient have any of the following symptoms? Cough   Nursing Interventions Nurse provided patient education   Pulse Ox monitoring Intermittent   Pulse Ox device source Patient   O2 Sat comments 95% 2LO2   O2 Sat: education provided Sat levels,  Monitoring frequency,  When to seek care   Is the patient/caregiver able to teach back steps to recovery at home? Rest and rebuild strength, gradually increase activity,  Eat a well-balance diet   COVID-19 call completed? Yes   Wrap up additional comments Pt states she feels she is getting better every day.  Pt has a PCP fu appt on 12/15/21. No questions/concerns.          Deena Mejía RN   Occupational Therapy      Patient Name:  Yvette Hutchinson   MRN:  [id number]    Patient not seen today secondary to Other (Comment) (OT unavailable in AM and procedure in PM). Will follow-up next service date.    10/14/2024

## 2024-10-15 ENCOUNTER — OFFICE VISIT (OUTPATIENT)
Dept: FAMILY MEDICINE CLINIC | Facility: CLINIC | Age: 74
End: 2024-10-15
Payer: MEDICARE

## 2024-10-15 ENCOUNTER — PATIENT ROUNDING (BHMG ONLY) (OUTPATIENT)
Dept: FAMILY MEDICINE CLINIC | Facility: CLINIC | Age: 74
End: 2024-10-15

## 2024-10-15 VITALS
OXYGEN SATURATION: 97 % | SYSTOLIC BLOOD PRESSURE: 140 MMHG | BODY MASS INDEX: 27.74 KG/M2 | DIASTOLIC BLOOD PRESSURE: 82 MMHG | HEIGHT: 64 IN | HEART RATE: 82 BPM | RESPIRATION RATE: 18 BRPM | WEIGHT: 162.5 LBS | TEMPERATURE: 98.3 F

## 2024-10-15 DIAGNOSIS — F41.9 ANXIETY: ICD-10-CM

## 2024-10-15 DIAGNOSIS — R73.03 PREDIABETES: ICD-10-CM

## 2024-10-15 DIAGNOSIS — R73.9 HYPERGLYCEMIA: ICD-10-CM

## 2024-10-15 DIAGNOSIS — E87.6 HYPOKALEMIA: ICD-10-CM

## 2024-10-15 DIAGNOSIS — I10 BENIGN HYPERTENSION: Primary | ICD-10-CM

## 2024-10-15 DIAGNOSIS — E66.3 OVERWEIGHT (BMI 25.0-29.9): ICD-10-CM

## 2024-10-15 LAB
EXPIRATION DATE: ABNORMAL
HBA1C MFR BLD: 5.9 % (ref 4.5–5.7)
Lab: ABNORMAL

## 2024-10-15 PROCEDURE — 3044F HG A1C LEVEL LT 7.0%: CPT

## 2024-10-15 PROCEDURE — 99214 OFFICE O/P EST MOD 30 MIN: CPT

## 2024-10-15 PROCEDURE — G2211 COMPLEX E/M VISIT ADD ON: HCPCS

## 2024-10-15 PROCEDURE — 1126F AMNT PAIN NOTED NONE PRSNT: CPT

## 2024-10-15 PROCEDURE — 3077F SYST BP >= 140 MM HG: CPT

## 2024-10-15 PROCEDURE — 3079F DIAST BP 80-89 MM HG: CPT

## 2024-10-15 PROCEDURE — 83036 HEMOGLOBIN GLYCOSYLATED A1C: CPT

## 2024-10-15 RX ORDER — BUTYROSPERMUM PARKII(SHEA BUTTER), SIMMONDSIA CHINENSIS (JOJOBA) SEED OIL, ALOE BARBADENSIS LEAF EXTRACT .01; 1; 3.5 G/100G; G/100G; G/100G
100 LIQUID TOPICAL DAILY
Qty: 90 CAPSULE | Refills: 3 | Status: SHIPPED | OUTPATIENT
Start: 2024-10-15

## 2024-10-15 NOTE — ASSESSMENT & PLAN NOTE
Patient with hx of hypokalemia, borderline low potassium levels, despite daily 20 meq potassium.  Do feel she would benefit from transitioning off of the hctz and changing instead to an ARB.  However, she is leaving the state for 6 months now so this is not a good time to start this change.  We will check a BMP and magnesium today. She is to continue current regimen for now.  When she returns in April, we will start weaning her off of hctz and potassium supplement and get her on an ARB with close monitoring/follow up.  BP has been running a bit high for a couple weeks but has been under new stress/anxiety. She is to continue monitoring and let us know if it is running >150/90.

## 2024-10-15 NOTE — ASSESSMENT & PLAN NOTE
Patient's (Body mass index is 28.33 kg/m².) indicates that they are overweight with health conditions that include hypertension . Weight is unchanged. BMI is above average; BMI management plan is completed. We discussed portion control and increasing exercise.

## 2024-10-15 NOTE — PROGRESS NOTES
October 15, 2024    Hello, may I speak with Jess Mackay?    My name is JOYCE      I am  with [facility] [address]  [place] [address].    Before we get started may I verify your date of birth? 1950    I am calling to officially welcome you to our practice and ask about your recent visit. Is this a good time to talk? yes    Tell me about your visit with us. What things went well?  VERY WELL, I REALLY LIKE HIM       We're always looking for ways to make our patients' experiences even better. Do you have recommendations on ways we may improve?  no    Overall were you satisfied with your first visit to our practice? yes       I appreciate you taking the time to speak with me today. Is there anything else I can do for you? no      Thank you, and have a great day.    October 15, 2024    Hello, may I speak with Jess Mackay?    My name is JOYCE      I am  with [facility] [address]  [address]  [place] [address].    Before we get started may I verify your date of birth? 1950    I am calling to officially welcome you to our practice and ask about your recent visit. Is this a good time to talk? yes    Tell me about your visit with us. What things went well?  VERY WELL, I REALLY LIKE HIM       We're always looking for ways to make our patients' experiences even better. Do you have recommendations on ways we may improve?  no    Overall were you satisfied with your first visit to our practice? yes       I appreciate you taking the time to speak with me today. Is there anything else I can do for you? no      Thank you, and have a great day.

## 2024-10-16 LAB
BUN SERPL-MCNC: 14 MG/DL (ref 8–27)
BUN/CREAT SERPL: 17 (ref 12–28)
CALCIUM SERPL-MCNC: 10.2 MG/DL (ref 8.7–10.3)
CHLORIDE SERPL-SCNC: 96 MMOL/L (ref 96–106)
CO2 SERPL-SCNC: 27 MMOL/L (ref 20–29)
CREAT SERPL-MCNC: 0.82 MG/DL (ref 0.57–1)
EGFRCR SERPLBLD CKD-EPI 2021: 75 ML/MIN/1.73
GLUCOSE SERPL-MCNC: 116 MG/DL (ref 70–99)
MAGNESIUM SERPL-MCNC: 1.8 MG/DL (ref 1.6–2.3)
POTASSIUM SERPL-SCNC: 3.3 MMOL/L (ref 3.5–5.2)
SODIUM SERPL-SCNC: 139 MMOL/L (ref 134–144)

## 2024-10-18 NOTE — PROGRESS NOTES
"Chief Complaint  Hypertension and Hypokalemia    Subjective          Jess Mackay presents to [facility] for     HPI  Follow up on potassium.  Briefly, taking hctz 25 mg qd (and amlodipine 10 mg qd) for htn. Also taking potassium chloride ER 20 MEQ daily. Potassium still running low, last check recently was 3.3. Mg is WNL.     Objective   Vital Signs:   /86 (BP Location: Right arm, Patient Position: Sitting, Cuff Size: Adult)   Pulse 51   Temp 97.9 °F (36.6 °C) (Temporal)   Resp 18   Ht 161.3 cm (63.5\")   Wt 73.9 kg (163 lb)   SpO2 97%   BMI 28.42 kg/m²     Physical Exam  Vitals and nursing note reviewed.   Constitutional:       General: She is not in acute distress.     Appearance: Normal appearance. She is not ill-appearing or diaphoretic.   HENT:      Head: Normocephalic and atraumatic.      Nose: No congestion or rhinorrhea.   Eyes:      Extraocular Movements: Extraocular movements intact.      Pupils: Pupils are equal, round, and reactive to light.   Cardiovascular:      Rate and Rhythm: Normal rate.   Pulmonary:      Effort: Pulmonary effort is normal. No respiratory distress.   Musculoskeletal:         General: Normal range of motion.   Skin:     General: Skin is warm and dry.   Neurological:      Mental Status: She is alert and oriented to person, place, and time.   Psychiatric:         Mood and Affect: Mood normal.         Behavior: Behavior normal.        Result Review :                 Assessment and Plan    Diagnoses and all orders for this visit:    1. Benign hypertension (Primary)  Overview:  Regimen: Hctz 25 mg qd, amlodipine 10 mg qd.  She is again hypokalemic with potassium of 3.3 despite 20 meq daily potassium supplementation.  She is unfortunately leaving for approximately 6 months either this week or next.  Would like to get her off the hydrochlorothiazide and replace it with an ARB.  She says that she is willing to remain in town for another " week.  We will discontinue her hydrochlorothiazide 25 mg daily and instead start her on valsartan-hydrochlorothiazide 80-12.5 mg daily.  She is to continue taking her potassium supplement for now.  She will repeat a BMP in exactly 1 week.  In addition, at that time she will get an 8 AM aldosterone renin ratio to check for hyperaldosteronism.  She is to continue checking daily blood pressures and let us know if it is consistently running greater than 150/90.    Orders:  -     valsartan-hydrochlorothiazide (DIOVAN-HCT) 80-12.5 MG per tablet; Take 1 tablet by mouth Daily.  Dispense: 90 tablet; Refill: 3  -     Basic metabolic panel; Future  -     Aldosterone / Renin Ratio; Future    2. Hypokalemia  -     Basic metabolic panel; Future  -     Aldosterone / Renin Ratio; Future    3. Overweight (BMI 25.0-29.9)  Overview:  Healthy diet and regular exercise encouraged.            Follow Up   No follow-ups on file.  Patient Instructions   Stop taking hydrochlorothiazide 25 mg.  Start taking valsartan-hydrochlorothiazide 80-12.5 mg once daily.  Check and record your blood pressure daily. Only check it after you have been seated and resting for at least 10 minutes. Let us know if it is routinely running greater than 150/90.  Next Monday, October 28th, go to the lab for repeat blood tests.  Get the blood tests done at 8 am, before you take your blood pressure medication that day.    Ashu Cardenas MD    NOTE: [employer], per Health Information accessibility laws, allows progress notes to be visible to patients. Please note that these notes will include professional medical terminology that may be somewhat confusing without some interpretation from your medical professional team. The intent of progress notes is to communicate information between any medical professionals involved in your case, or to serve as future reference for myself or any other provider when reviewing your case, as well as a reference for the patient viewing  the record. Please ask a member of the medical team if you have any questions about terminology or content of note.    DISCLAIMER: This note was transcribed using a Dragon Medical voice recognition system. It may contain mistakes that may have not been recognized during proofreading. This note was also amended on the date signed due to recognized errors.

## 2024-10-21 ENCOUNTER — OFFICE VISIT (OUTPATIENT)
Dept: FAMILY MEDICINE CLINIC | Facility: CLINIC | Age: 74
End: 2024-10-21
Payer: MEDICARE

## 2024-10-21 VITALS
TEMPERATURE: 97.9 F | HEART RATE: 51 BPM | DIASTOLIC BLOOD PRESSURE: 86 MMHG | RESPIRATION RATE: 18 BRPM | WEIGHT: 163 LBS | BODY MASS INDEX: 27.83 KG/M2 | OXYGEN SATURATION: 97 % | SYSTOLIC BLOOD PRESSURE: 124 MMHG | HEIGHT: 64 IN

## 2024-10-21 DIAGNOSIS — E87.6 HYPOKALEMIA: ICD-10-CM

## 2024-10-21 DIAGNOSIS — I10 BENIGN HYPERTENSION: Primary | ICD-10-CM

## 2024-10-21 DIAGNOSIS — E66.3 OVERWEIGHT (BMI 25.0-29.9): ICD-10-CM

## 2024-10-21 PROCEDURE — 1126F AMNT PAIN NOTED NONE PRSNT: CPT

## 2024-10-21 PROCEDURE — G2211 COMPLEX E/M VISIT ADD ON: HCPCS

## 2024-10-21 PROCEDURE — 99214 OFFICE O/P EST MOD 30 MIN: CPT

## 2024-10-21 PROCEDURE — 3074F SYST BP LT 130 MM HG: CPT

## 2024-10-21 PROCEDURE — 3079F DIAST BP 80-89 MM HG: CPT

## 2024-10-21 RX ORDER — VALSARTAN AND HYDROCHLOROTHIAZIDE 80; 12.5 MG/1; MG/1
1 TABLET, FILM COATED ORAL DAILY
Qty: 90 TABLET | Refills: 3 | Status: SHIPPED | OUTPATIENT
Start: 2024-10-21

## 2024-10-21 NOTE — PATIENT INSTRUCTIONS
Stop taking hydrochlorothiazide 25 mg.  Start taking valsartan-hydrochlorothiazide 80-12.5 mg once daily.  Check and record your blood pressure daily. Only check it after you have been seated and resting for at least 10 minutes. Let us know if it is routinely running greater than 150/90.  Next Monday, October 28th, go to the lab for repeat blood tests.  Get the blood tests done at 8 am, before you take your blood pressure medication that day.

## 2024-12-31 ENCOUNTER — TELEPHONE (OUTPATIENT)
Dept: FAMILY MEDICINE CLINIC | Facility: CLINIC | Age: 74
End: 2024-12-31
Payer: MEDICARE

## 2024-12-31 NOTE — TELEPHONE ENCOUNTER
Returned phone call to patient and she reports that aches have only been present within the last 2-3 weeks. Patient states she will not be back until March. Patient states that she will try and find a place where she is to see if they can do basic blood work for her to make sure it is not her potassium causing problems. Patient agree's to not make medication changes at this time.

## 2024-12-31 NOTE — TELEPHONE ENCOUNTER
"    Caller: Jess Mackay \"Julianna\"    Relationship: Self    Best call back number: [phone]     Which medication are you concerned about: valsartan-hydrochlorothiazide (DIOVAN-HCT) 80-12.5 MG per tablet     Who prescribed you this medication: LYUBOV    When did you start taking this medication: END OF OCTOBER    What are your concerns: PATIENT IS HAVING PROGRESSIVELY WORSENING ACHES.      "

## 2025-03-31 NOTE — PROGRESS NOTES
Physical Therapy Daily Progress Note      Patient: Jess Mackay   : 1950  Diagnosis/ICD-10 Code:  Chronic bilateral low back pain with right-sided sciatica [M54.41, G89.29]   Problems Addressed this Visit     None      Visit Diagnoses     Chronic bilateral low back pain with right-sided sciatica    -  Primary      Diagnoses       Codes Comments    Chronic bilateral low back pain with right-sided sciatica    -  Primary ICD-10-CM: M54.41, G89.29  ICD-9-CM: 724.2, 724.3, 338.29          Referring practitioner: Ivette Schaefer MD  Date of Initial Visit: Type: THERAPY  Noted: 3/3/2021  Today's Date: 3/22/2021    VISIT#: 5    Subjective Pt stated that she is doing alright.  Has started packing up items so they can sell her house.      Objective Completed exercise as listed.  Finished with stretching and then heat.      See Exercise, Manual, and Modality Logs for complete treatment.     Assessment/Plan Some difficulty with tandem stance and balance with eyes closed.      Progress per Plan of Care         Timed:         Manual Therapy:    15     mins  45754;     Therapeutic Exercise:    15     mins  53908;     Neuromuscular Michael:    8    mins  73222;    Therapeutic Activity:          mins  54102;     Gait Training:           mins  17988;     Ultrasound:          mins  46366;    Ionto                                   mins   63320  Self Care                            mins   10540  Canalith Repos                   mins  26992    Un-Timed:  Electrical Stimulation:         mins  03705 ( );  Dry Needling          mins self-pay  Traction          mins 39473  Low Eval          Mins  66323  Mod Eval          Mins  92664  High Eval                            Mins  55100  Re-Eval                               mins  69813    Timed Treatment:   38   mins   Total Treatment:     48   mins    Palmira Leigh PT  Physical Therapist    
Admission

## 2025-04-04 DIAGNOSIS — I10 BENIGN HYPERTENSION: ICD-10-CM

## 2025-04-04 RX ORDER — POTASSIUM CHLORIDE 1500 MG/1
20 TABLET, EXTENDED RELEASE ORAL DAILY
Qty: 90 TABLET | Refills: 3 | Status: SHIPPED | OUTPATIENT
Start: 2025-04-04

## 2025-05-08 DIAGNOSIS — I10 BENIGN HYPERTENSION: ICD-10-CM

## 2025-05-09 ENCOUNTER — TELEPHONE (OUTPATIENT)
Dept: FAMILY MEDICINE CLINIC | Facility: CLINIC | Age: 75
End: 2025-05-09
Payer: MEDICARE

## 2025-05-09 RX ORDER — POTASSIUM CHLORIDE 1500 MG/1
20 TABLET, EXTENDED RELEASE ORAL DAILY
Qty: 90 TABLET | Refills: 3 | Status: SHIPPED | OUTPATIENT
Start: 2025-05-09

## 2025-05-09 NOTE — TELEPHONE ENCOUNTER
"  Caller: Jess Mackay \"Julianna\"    Relationship: Self    Best call back number: [phone]     What was the call regarding: PATIENT STATED THAT SHE RECEIVED A NOTICE FROM [facility] IN REGARDS TO THE PRESCRIPTION FOR potassium chloride ER (K-TAB) 20 MEQ tablet controlled-release ER tablet THAT FIRST SAID THEY WOULD CONTACT THE OFFICE FOR PERMISSION TO FILL IT. PATIENT JUST RECEIVED A TEXT THAT IT COULD NOT BE REFILLED UNTIL 07/21/25 AS FAR AS INSURANCE IS CONCERNED. PATIENT IS DOWN TO 6 PILLS. PLEASE ADVISE.      "

## 2025-06-16 DIAGNOSIS — I10 BENIGN HYPERTENSION: ICD-10-CM

## 2025-06-16 RX ORDER — VALSARTAN AND HYDROCHLOROTHIAZIDE 80; 12.5 MG/1; MG/1
1 TABLET, FILM COATED ORAL DAILY
Qty: 90 TABLET | Refills: 1 | Status: SHIPPED | OUTPATIENT
Start: 2025-06-16

## 2025-07-03 DIAGNOSIS — I10 BENIGN HYPERTENSION: ICD-10-CM

## 2025-07-03 RX ORDER — AMLODIPINE BESYLATE 10 MG/1
10 TABLET ORAL DAILY
Qty: 90 TABLET | Refills: 3 | Status: SHIPPED | OUTPATIENT
Start: 2025-07-03